# Patient Record
Sex: FEMALE | Race: WHITE | Employment: UNEMPLOYED | URBAN - METROPOLITAN AREA
[De-identification: names, ages, dates, MRNs, and addresses within clinical notes are randomized per-mention and may not be internally consistent; named-entity substitution may affect disease eponyms.]

---

## 2017-03-17 ENCOUNTER — OFFICE VISIT (OUTPATIENT)
Dept: FAMILY MEDICINE | Facility: CLINIC | Age: 57
End: 2017-03-17
Payer: COMMERCIAL

## 2017-03-17 VITALS
SYSTOLIC BLOOD PRESSURE: 160 MMHG | HEIGHT: 62 IN | RESPIRATION RATE: 18 BRPM | BODY MASS INDEX: 30.36 KG/M2 | TEMPERATURE: 97.8 F | HEART RATE: 77 BPM | DIASTOLIC BLOOD PRESSURE: 90 MMHG | OXYGEN SATURATION: 98 % | WEIGHT: 165 LBS

## 2017-03-17 DIAGNOSIS — I10 BENIGN ESSENTIAL HYPERTENSION: ICD-10-CM

## 2017-03-17 DIAGNOSIS — Z84.81 FAMILY HISTORY OF GENETIC DISEASE CARRIER: ICD-10-CM

## 2017-03-17 DIAGNOSIS — D12.6 TUBULAR ADENOMA OF COLON: ICD-10-CM

## 2017-03-17 DIAGNOSIS — R41.3 MEMORY LOSS: Primary | ICD-10-CM

## 2017-03-17 DIAGNOSIS — Z82.0 FAMILY HISTORY OF ALZHEIMER'S DISEASE: ICD-10-CM

## 2017-03-17 LAB
ERYTHROCYTE [DISTWIDTH] IN BLOOD BY AUTOMATED COUNT: 12.7 % (ref 10–15)
HCT VFR BLD AUTO: 43.2 % (ref 35–47)
HGB BLD-MCNC: 14.5 G/DL (ref 11.7–15.7)
HIV 1+2 AB+HIV1 P24 AG SERPL QL IA: NORMAL
MCH RBC QN AUTO: 30 PG (ref 26.5–33)
MCHC RBC AUTO-ENTMCNC: 33.6 G/DL (ref 31.5–36.5)
MCV RBC AUTO: 89 FL (ref 78–100)
PLATELET # BLD AUTO: 182 10E9/L (ref 150–450)
RBC # BLD AUTO: 4.84 10E12/L (ref 3.8–5.2)
VIT B12 SERPL-MCNC: 438 PG/ML (ref 193–986)
WBC # BLD AUTO: 5.5 10E9/L (ref 4–11)

## 2017-03-17 PROCEDURE — 85027 COMPLETE CBC AUTOMATED: CPT | Performed by: FAMILY MEDICINE

## 2017-03-17 PROCEDURE — 86780 TREPONEMA PALLIDUM: CPT | Performed by: FAMILY MEDICINE

## 2017-03-17 PROCEDURE — 84443 ASSAY THYROID STIM HORMONE: CPT | Performed by: FAMILY MEDICINE

## 2017-03-17 PROCEDURE — 82607 VITAMIN B-12: CPT | Performed by: FAMILY MEDICINE

## 2017-03-17 PROCEDURE — 87389 HIV-1 AG W/HIV-1&-2 AB AG IA: CPT | Performed by: FAMILY MEDICINE

## 2017-03-17 PROCEDURE — 36415 COLL VENOUS BLD VENIPUNCTURE: CPT | Performed by: FAMILY MEDICINE

## 2017-03-17 PROCEDURE — 80053 COMPREHEN METABOLIC PANEL: CPT | Performed by: FAMILY MEDICINE

## 2017-03-17 PROCEDURE — 99214 OFFICE O/P EST MOD 30 MIN: CPT | Performed by: FAMILY MEDICINE

## 2017-03-17 NOTE — MR AVS SNAPSHOT
After Visit Summary   3/17/2017    Ирина Rahman    MRN: [id number]           Patient Information     Date Of Birth          1960        Visit Information        Provider Department      3/17/2017 10:30 AM Mag Christianson MD [facility]        Today's Diagnoses     Memory loss    -  1    Family history of genetic disease carrier        Tubular adenoma of colon          Care Instructions    I'm concerned about your memory, too!    We are going to:    1) Check some lab tests for vitamin deficiencies, infection and thyroid problems today to see if they are the cause.    2) Have you schedule an MRI of your brain.   Please call for this appointment at [facility] (call [phone]).      3) Have you see an audiologist for a more thorough hearing exam.      If all of these tests are normal, I'd like you to see 2 specialists:    1) I'd like you to see a neuropsych specialist who can do memory testing.    2) I'd like you to see a neurologist at the [facility] in [place].      Also, you are due for a physical/colon cancer/mammogram!  Let's have you make this appointment when we've figured out all of the above.          Follow-ups after your visit        Additional Services     AUDIOLOGY ADULT REFERRAL       Your provider has referred you to: [facility]: [facility] [phone]   [email or web address]    Specialty Testing:  Audiogram w/Tymps and Reflexes (Comprehensive Audiology Evaluation) and Hearing Aid Consultation            NEUROLOGY ADULT REFERRAL       Your provider has referred you to: [facility]: [facility] [phone]   [email or web address]    Reason for Referral: Memory concerns and family history of alzheimer's and ALS    Please be aware that coverage of these services is subject to the terms and limitations  of your health insurance plan.  Call member services at your health plan with any benefit or coverage questions.      Please bring the following with you to your appointment:    (1) Any X-Rays, CTs or MRIs which have been performed.  Contact the facility where they were done to arrange for  prior to your scheduled appointment.    (2) List of current medications  (3) This referral request   (4) Any documents/labs given to you for this referral            NEUROPSYCHOLOGY REFERRAL       Your provider has referred you to:    [facility]: [facility] ([facility]) - [place] [phone]   [email or web address]  [facility]: [facility], [place] and [place] [phone]   [email or web address]    All scheduling is subject to the client's specific insurance plan & benefits, provider/location availability, and provider clinical specialities.  Please arrive 15 minutes early for your first appointment and bring your completed paperwork.    Please be aware that coverage of these services is subject to the terms and limitations of your health insurance plan.  Call member services at your health plan with any benefit or coverage questions.    Please bring the following to your appointment:  >>   Any x-rays, CTs or MRIs which have been performed.  Contact the facility where they were done to arrange for  prior to your scheduled appointment.  Any new CT, MRI or other procedures ordered by your specialist must be performed at a [place] facility or coordinated by your clinic's referral office.    >>   List of current medications   >>   This referral request   >>   Any documents/labs given to you for this referral                  Future tests that were ordered for you today     Open Future Orders        Priority Expected Expires Ordered    MR Brain w/o & w Contrast Routine  3/17/2018 3/17/2017            Who to contact      "If you have questions or need follow up information about today's clinic visit or your schedule please contact [facility] directly at [phone].  Normal or non-critical lab and imaging results will be communicated to you by MyChart, letter or phone within 4 business days after the clinic has received the results. If you do not hear from us within 7 days, please contact the clinic through "[employer]hart or phone. If you have a critical or abnormal lab result, we will notify you by phone as soon as possible.  Submit refill requests through [employer] or call your pharmacy and they will forward the refill request to us. Please allow 3 business days for your refill to be completed.          Additional Information About Your Visit        [employer] Information     [employer] lets you send messages to your doctor, view your test results, renew your prescriptions, schedule appointments and more. To sign up, go to [email or web address] . Click on \"Log in\" on the left side of the screen, which will take you to the Welcome page. Then click on \"Sign up Now\" on the right side of the page.     You will be asked to enter the access code listed below, as well as some personal information. Please follow the directions to create your username and password.     Your access code is: [email or web address]  Expires: 6/15/2017 11:14 AM     Your access code will  in 90 days. If you need help or a new code, please call your [place] clinic or [phone].        Care EveryWhere ID     This is your Care EveryWhere ID. This could be used by other organizations to access your [place] medical records  [id number]        Your Vitals Were     Pulse Temperature Respirations Height Last Period Pulse Oximetry    77 97.8  F (36.6  C) (Tympanic) 18 5' 2\" (1.575 m) 2012 98%    BMI (Body Mass Index)                   30.18 kg/m2            Blood Pressure from Last 3 Encounters:   17 160/90   16 118/86 "   10/26/16 (!) 158/100    Weight from Last 3 Encounters:   03/17/17 165 lb (74.8 kg)   11/01/16 171 lb (77.6 kg)   10/26/16 177 lb 3.2 oz (80.4 kg)              We Performed the Following     Anti Treponema     AUDIOLOGY ADULT REFERRAL     CBC with platelets     Comprehensive metabolic panel     HIV Antigen Antibody Combo     NEUROLOGY ADULT REFERRAL     NEUROPSYCHOLOGY REFERRAL     TSH with free T4 reflex     Vitamin B12        Primary Care Provider Office Phone # Fax #    Mag Christianson [phone]       [facility] [address] [place] [address]        Thank you!     Thank you for choosing [facility]  for your care. Our goal is always to provide you with excellent care. Hearing back from our patients is one way we can continue to improve our services. Please take a few minutes to complete the written survey that you may receive in the mail after your visit with us. Thank you!             Your Updated Medication List - Protect others around you: Learn how to safely use, store and throw away your medicines at www.disposemymeds.org.          This list is accurate as of: 3/17/17 11:14 AM.  Always use your most recent med list.                   Brand Name Dispense Instructions for use    losartan-hydrochlorothiazide 100-25 MG per tablet    HYZAAR    30 tablet    Take 1 tablet by mouth daily       UNKNOWN TO PATIENT

## 2017-03-17 NOTE — PROGRESS NOTES
"  SUBJECTIVE:  Ирина Rahman is an 57 year old female who presents for evaluation and treatment of confusion symptoms, here with  Brenton and sister Margarette, as well as daughter Sybil who have all asked her to come in.  Ирина doesn't particularly notice and difference, but her family all notices that she has been different lately and neighbors have noticed as well.      Her  notes that over the past several years, but particularly over the past 6-12 months he has noted that she is unable to do things she used to do such as fill out disability paperwork.  He has noted that she has more word finding difficulty, and will sometimes point to things.  He states that neighbors have told him \"you have to get her evaluated\".      Ирина's daughter notes that she repeats herself and stories frequently, and will sometimes interrupt conversations with non-sequitors.  Additionally her sister Margarette said she has noted these changes for at least the last 5 years.    Ирина has not had complete audiology testing, and she feels like some of her difficulty may be not hearing as well.  Ирина does note that she doesn't like to drive as much although denies getting lost.  She denies memory problems or being confused, but is okay having evaluation since her family is concerned.  She denies any physical symptoms.  She does note that she fell about a year ago and broke her leg; wonders if she could have hit her head during that time.    Current symptoms include forgetfulness, depression, social isolation, loss of memory, difficulty with planning or organization and appears apathetic. Family history positive for  Alzheimer's and dementia in the patient s mother and father. Previous treatment modalities employed include none.     Risk factors: genetics/family history  Organic causes of dementia present: neurological    Current Outpatient Prescriptions   Medication     losartan-hydrochlorothiazide (HYZAAR) 100-25 MG per tablet " "    UNKNOWN TO PATIENT     No current facility-administered medications for this visit.      ALLERGY:  Allergies   Allergen Reactions     Latex      Nkda [No Known Drug Allergies]      Red Dye Swelling     Social History   Substance Use Topics     Smoking status: Never Smoker     Smokeless tobacco: Never Used     Alcohol use 0.0 oz/week     0 Standard drinks or equivalent per week      Comment: wine occ     ROS:  (Documentation requirements: Problem=system related; Extended 2-9 system; Complete=10 individual documented and notation all others systems neg.)    CONSTITUTIONAL: NEGATIVE  EYES: NEGATIVE  ENT/MOUTH: NEGATIVE  RESP: NEGATIVE  CV: NEGATIVE  GI: NEGATIVE  : NEGATIVE  MUSCULOSKELETAL: NEGATIVE  INTEGUMENTARY/SKIN: NEGATIVE  BREAST: NEGATIVE  NEURO: NEGATIVE  ENDOCRINE: NEGATIVE  HEME/ALLERGY/IMMUNE: NEGATIVE  PSYCHIATRIC: NEGATIVE    OBJECTIVE:  /90  Pulse 77  Temp 97.8  F (36.6  C) (Tympanic)  Resp 18  Ht 5' 2\" (1.575 m)  Wt 165 lb (74.8 kg)  LMP 06/01/2012  SpO2 98%  BMI 30.18 kg/m2  Mental Status Examination  Posture and motor behavior:  normal  Dress, grooming, personal hygiene:  normal  Facial expression:  Somewhat flattened expression and affect   Speech:  normal  Mood:  normal  Coherency and relevance of thought:  appropriate and within normal limits  Thought content:  normal and within normal limits  Perceptions:  normal  Orientation:  normal  Attention and concentration:  normal  Memory:  See CIT - failed  Information:  normal  Vocabulary:  normal  Abstract reasoning:  not addressed  Judgment:  within normal limits    General appearance: healthy, alert and no distress  Neuro: Gait normal. Reflexes normal and symmetric. Sensation grossly WNL.          Six Item Cognitive Impairment Test   (6CIT):      What year is it?                               Correct - 0 points    What month is it?                               Correct - 0 points      Give the patient an address to remember with five " components:   [place] ( first and last name - 2 components)   [address]  (number and name of street - 2 components)   [place] ( city - 1 component)      About what time is it (within the hour)? Correct - 0 points    Count backwards from 20 to 1:   One error - 2 points    Say the months of the year in reverse: One error - 2 points    Repeat the address phrase:   3 errors - 6 points    Total 6CIT Score:      10/28    Interpretation: The 6CIT uses an inverse score and questions are weighted to produce a total out of 28. Scores of 0-7 are considered normal and 8 or more significant.    Advantages The test has high sensitivity without compromising specificity even in mild dementia. It is easy to translate linguistically and culturally.  Disadvantages The main disadvantage is in the scoring and weighting of the test, which is initially confusing, however computer models have simplified this greatly.    Probability Statistics: At the 7/8 cut off: Overall figures sensitivity 90% specificity 100%, in mild dementia sensitivity = 78% , specificity = 100%    Copyright 2000 [facility]. Courtesy of Dr. Tung Figueroa      ASSESSMENT:    ICD-10-CM    1. Memory loss R41.3 NEUROPSYCHOLOGY REFERRAL     NEUROLOGY ADULT REFERRAL     Vitamin B12     TSH with free T4 reflex     CBC with platelets     Comprehensive metabolic panel     Anti Treponema     HIV Antigen Antibody Combo     MR Brain w/o & w Contrast     AUDIOLOGY ADULT REFERRAL   2. Family history of genetic disease carrier Z84.81    3. Tubular adenoma of colon D12.6    4. Benign essential hypertension I10    5. Family history of Alzheimer's disease Z82.0            PLAN:  Patient Instructions   I'm concerned about your memory, too!    We are going to:    1) Check some lab tests for vitamin deficiencies, infection and thyroid problems today to see if they are the cause.    2) Have you schedule an MRI of your brain.   Please call for  this appointment at [facility] (call [phone]).      3) Have you see an audiologist for a more thorough hearing exam.      If all of these tests are normal, I'd like you to see 2 specialists:    1) I'd like you to see a neuropsych specialist who can do memory testing.    2) I'd like you to see a neurologist at the [facility] in [place].      Also, you are due for a physical/colon cancer/mammogram!  Let's have you make this appointment when we've figured out all of the above.

## 2017-03-17 NOTE — PATIENT INSTRUCTIONS
I'm concerned about your memory, too!    We are going to:    1) Check some lab tests for vitamin deficiencies, infection and thyroid problems today to see if they are the cause.    2) Have you schedule an MRI of your brain.   Please call for this appointment at [facility] (call [phone]).      3) Have you see an audiologist for a more thorough hearing exam.      If all of these tests are normal, I'd like you to see 2 specialists:    1) I'd like you to see a neuropsych specialist who can do memory testing.    2) I'd like you to see a neurologist at the [facility] in [place].      Also, you are due for a physical/colon cancer/mammogram!  Let's have you make this appointment when we've figured out all of the above.

## 2017-03-17 NOTE — NURSING NOTE
"Chief Complaint   Patient presents with     Memory Loss       Initial /90  Pulse 77  Temp 97.8  F (36.6  C) (Tympanic)  Resp 18  Ht 5' 2\" (1.575 m)  Wt 165 lb (74.8 kg)  LMP 06/01/2012  SpO2 98%  BMI 30.18 kg/m2 Estimated body mass index is 30.18 kg/(m^2) as calculated from the following:    Height as of this encounter: 5' 2\" (1.575 m).    Weight as of this encounter: 165 lb (74.8 kg).  Medication Reconciliation: complete   .Heriberto MEJÍA      "

## 2017-03-18 LAB
ALBUMIN SERPL-MCNC: 4.2 G/DL (ref 3.4–5)
ALP SERPL-CCNC: 87 U/L (ref 40–150)
ALT SERPL W P-5'-P-CCNC: 38 U/L (ref 0–50)
ANION GAP SERPL CALCULATED.3IONS-SCNC: 9 MMOL/L (ref 3–14)
AST SERPL W P-5'-P-CCNC: 18 U/L (ref 0–45)
BILIRUB SERPL-MCNC: 0.6 MG/DL (ref 0.2–1.3)
BUN SERPL-MCNC: 21 MG/DL (ref 7–30)
CALCIUM SERPL-MCNC: 9.2 MG/DL (ref 8.5–10.1)
CHLORIDE SERPL-SCNC: 103 MMOL/L (ref 94–109)
CO2 SERPL-SCNC: 29 MMOL/L (ref 20–32)
CREAT SERPL-MCNC: 0.82 MG/DL (ref 0.52–1.04)
GFR SERPL CREATININE-BSD FRML MDRD: 72 ML/MIN/1.7M2
GLUCOSE SERPL-MCNC: 115 MG/DL (ref 70–99)
POTASSIUM SERPL-SCNC: 3.6 MMOL/L (ref 3.4–5.3)
PROT SERPL-MCNC: 8.2 G/DL (ref 6.8–8.8)
SODIUM SERPL-SCNC: 141 MMOL/L (ref 133–144)
T PALLIDUM IGG+IGM SER QL: NEGATIVE
TSH SERPL DL<=0.005 MIU/L-ACNC: 1.15 MU/L (ref 0.4–4)

## 2017-03-24 ENCOUNTER — HOSPITAL ENCOUNTER (OUTPATIENT)
Dept: MRI IMAGING | Facility: CLINIC | Age: 57
Discharge: HOME | End: 2017-03-24
Attending: FAMILY MEDICINE | Admitting: FAMILY MEDICINE
Payer: COMMERCIAL

## 2017-03-24 DIAGNOSIS — R41.3 MEMORY LOSS: ICD-10-CM

## 2017-03-24 PROCEDURE — 70553 MRI BRAIN STEM W/O & W/DYE: CPT

## 2017-03-24 PROCEDURE — A9585 GADOBUTROL INJECTION: HCPCS | Performed by: FAMILY MEDICINE

## 2017-03-24 PROCEDURE — 25500064 ZZH RX 255 OP 636: Performed by: FAMILY MEDICINE

## 2017-03-24 RX ORDER — GADOBUTROL 604.72 MG/ML
7 INJECTION INTRAVENOUS ONCE
Status: COMPLETED | OUTPATIENT
Start: 2017-03-24 | End: 2017-03-24

## 2017-03-24 RX ADMIN — GADOBUTROL 7 ML: 604.72 INJECTION INTRAVENOUS at 17:07

## 2017-03-28 ENCOUNTER — OFFICE VISIT (OUTPATIENT)
Dept: AUDIOLOGY | Facility: CLINIC | Age: 57
End: 2017-03-28
Payer: COMMERCIAL

## 2017-03-28 DIAGNOSIS — H93.25 CENTRAL AUDITORY PROCESSING DISORDER (CAPD): Primary | ICD-10-CM

## 2017-03-28 PROCEDURE — 92557 COMPREHENSIVE HEARING TEST: CPT | Performed by: AUDIOLOGIST

## 2017-03-28 PROCEDURE — 92567 TYMPANOMETRY: CPT | Performed by: AUDIOLOGIST

## 2017-03-28 NOTE — PROGRESS NOTES
Audiology Note     SUBJECTIVE:  Ирина Rahman is a 57 year old female, accompanied by her , was seen at [facility] for audiologic evaluation and was referred by Dr. Mag Christianson. The patient reports a bad fall with possible head injury with broken bones 1 year ago.  She has a slow decline in hearing over the past 4 years. The patient denies family history of hearing loss, chronic middle ear problems, tinnitus, dizziness, otalgia, ear drainage and noise exposure.  The patient notes difficulty with communication in a variety of listening situations and when background noise is present.    OBJECTIVE:    Otoscopic exam indicates ears are clear of cerumen bilaterally    Pure Tone Thresholds assessed using conventional audiometry with good reliability from 250-8000 Hz bilaterally using high frequency circumaural headphones revealed;    RIGHT:  Normal hearing     LEFT:    Normal hearing   Please refer to scanned audiogram(s) for further details.     Speech Reception Threshold:    RIGHT: 30 dB HL    LEFT:   30 dB HL    Word Recognition Score:    RIGHT: 80% at 65 dB HL using NU-6  recorded word list.    LEFT:   76% at 65 dB HL using NU-6 recorded word list.    Tympanogram:     RIGHT: Type A     LEFT:   Type A     Acoustic Reflexes (reported by stimulus ear):  RIGHT: Ipsilateral is absent at frequencies tested  RIGHT: Contralateral is absent at frequencies tested  LEFT:   Ipsilateral is absent at frequencies tested  LEFT:   Contralateral is absent at frequencies tested     ASSESSMENT:   Central Auditory Processing Disorder  Peripheral hearing is within normal limits, however SRTs are in mild hearing loss range and Word recognition scores are lower than expected based on normal hearing threshold levels. Today s results were discussed with the patient in detail and a copy of the audiogram was provided upon request.    PLAN: Patient was counseled regarding hearing loss and impact on communication.   Patient is a not candidate for amplification at this time, with Articulation Index scores of 96% right and 95% left. Handouts on good communication strategies and hearing were provided. Referred back to Dr. Christianson for follow up. Please call this clinic with questions regarding these results or recommendations.    Sandra Lovett M.S., F-AAA  Licensed Audiologist, [place] #0000

## 2017-03-29 NOTE — PROGRESS NOTES
Called Ирина & Brenton:  MRI shows mild cerebral atrophy, no other findings.  Hearing test was okay.  Also - lab tests normal.  Should follow up with neurology and neuropsych as we discussed.  Mag Christianson MD

## 2017-04-21 ENCOUNTER — OFFICE VISIT (OUTPATIENT)
Dept: FAMILY MEDICINE | Facility: CLINIC | Age: 57
End: 2017-04-21
Payer: COMMERCIAL

## 2017-04-21 VITALS
WEIGHT: 165 LBS | DIASTOLIC BLOOD PRESSURE: 120 MMHG | TEMPERATURE: 97.2 F | SYSTOLIC BLOOD PRESSURE: 172 MMHG | HEART RATE: 80 BPM | HEIGHT: 62 IN | BODY MASS INDEX: 30.36 KG/M2

## 2017-04-21 DIAGNOSIS — E55.9 VITAMIN D DEFICIENCY: ICD-10-CM

## 2017-04-21 DIAGNOSIS — S82.892A FRACTURE OF LEFT ANKLE: Primary | ICD-10-CM

## 2017-04-21 DIAGNOSIS — I16.0 HYPERTENSIVE URGENCY: Primary | ICD-10-CM

## 2017-04-21 LAB — ERYTHROCYTE [SEDIMENTATION RATE] IN BLOOD BY WESTERGREN METHOD: 9 MM/H (ref 0–30)

## 2017-04-21 PROCEDURE — 36415 COLL VENOUS BLD VENIPUNCTURE: CPT | Performed by: FAMILY MEDICINE

## 2017-04-21 PROCEDURE — 85652 RBC SED RATE AUTOMATED: CPT

## 2017-04-21 PROCEDURE — 99214 OFFICE O/P EST MOD 30 MIN: CPT | Performed by: FAMILY MEDICINE

## 2017-04-21 PROCEDURE — 80048 BASIC METABOLIC PNL TOTAL CA: CPT | Performed by: FAMILY MEDICINE

## 2017-04-21 PROCEDURE — 82306 VITAMIN D 25 HYDROXY: CPT

## 2017-04-21 RX ORDER — HYDRALAZINE HYDROCHLORIDE 25 MG/1
12.5-25 TABLET, FILM COATED ORAL 3 TIMES DAILY
Qty: 90 TABLET | Refills: 1 | Status: SHIPPED | OUTPATIENT
Start: 2017-04-21 | End: 2017-06-12

## 2017-04-21 RX ORDER — AMLODIPINE BESYLATE 5 MG/1
5 TABLET ORAL DAILY
Qty: 90 TABLET | Refills: 1 | Status: SHIPPED | OUTPATIENT
Start: 2017-04-21 | End: 2017-08-30

## 2017-04-21 NOTE — PROGRESS NOTES
Hypertensive urgency.  Plan:  Start hydralazine 25 mg TID.  If drops too low go to 12.5 mg TID  Follow up with me in clinic tomorrow  Continue hyzaar    .    SUBJECTIVE:                                                    Ирина Rahman is a 57 year old female who presents to clinic today for the following health issues:    Was in and saw neurology yesterday; had blood pressure 160/100 or 170/110 on repeat.  This morning took 2 of blood pressure medications (lisinopril/hctz) and still too high.  Worried.  Feeling fine though; no HA, no vision changes.      Problem list and histories reviewed & adjusted, as indicated.  Additional history: as documented    BP Readings from Last 3 Encounters:   04/21/17 (!) 172/120   03/17/17 160/90   11/01/16 118/86    Wt Readings from Last 3 Encounters:   03/17/17 165 lb (74.8 kg)   11/01/16 171 lb (77.6 kg)   10/26/16 177 lb 3.2 oz (80.4 kg)                    Reviewed and updated as needed this visit by clinical staff       Reviewed and updated as needed this visit by Provider         ROS:  Constitutional, HEENT, cardiovascular, pulmonary, gi and gu systems are negative, except as otherwise noted.    OBJECTIVE:                                                    BP (!) 172/120  LMP 06/01/2012  There is no height or weight on file to calculate BMI.  GENERAL: healthy, alert and no distress  EYES: Eyes grossly normal to inspection, PERRL and conjunctivae and sclerae normal  RESP: lungs clear to auscultation - no rales, rhonchi or wheezes  CV: regular rate and rhythm, normal S1 S2, no S3 or S4, no murmur, click or rub, no peripheral edema and peripheral pulses strong       ASSESSMENT/PLAN:                                                        Diagnoses and all orders for this visit:    Hypertensive urgency  -     Basic metabolic panel  -     hydrALAZINE (APRESOLINE) 25 MG tablet; Take 0.5-1 tablets (12.5-25 mg) by mouth 3 times daily  -     amLODIPine (NORVASC) 5 MG tablet; Take 1  tablet (5 mg) by mouth daily     --No symptoms, need to get under control within a day or two.   --Add amlodipine dialy.   --Start hydralazine today.   --Check blood pressure throughout the day today.  If develops symptoms or if blood pressure goes over 200/120 - to ER.   --Follow up in clinic tomorrow, will adjust as needed.    Greater than 25 minutes total were spent on this encounter, of which more than 50% was spent in direct communication with the patient including history, exam, counseling and coordination of care.    Mag Christianson MD  [facility]

## 2017-04-21 NOTE — MR AVS SNAPSHOT
After Visit Summary   4/21/2017    Ирина Rahman    MRN: [id number]           Patient Information     Date Of Birth          1960        Visit Information        Provider Department      4/21/2017 9:45 AM Mag Christianson MD [facility]        Today's Diagnoses     Hypertensive urgency    -  1       Follow-ups after your visit        Your next 10 appointments already scheduled     Apr 22, 2017 10:15 AM CDT   SHORT with Mag Christianson MD   [facility] ([facility])    [address] [place] [address]   [phone]            Apr 25, 2017  1:15 PM CDT   LAB with UP LAB   [facility] ([facility])    [address] [facility] [id number] [phone]           Patient must bring picture ID.  Patient should be prepared to give a urine specimen  Please do not eat 10-12 hours before your appointment if you are coming in fasting for labs on lipids, cholesterol, or glucose (sugar).  Pregnant women should follow their Care Team instructions. Water with medications is okay. Do not drink coffee or other fluids.   If you have concerns about taking  your medications, please ask at office or if scheduling via [employer], send a message by clicking on Secure Messaging, Message Your Care Team.            May 03, 2017  9:00 AM CDT   (Arrive by 8:45 AM)   New Patient Visit with STACEY Elliott [facility] ([facility])    [address] [facility]  [address] [facility] [address] [phone]              Who to contact     If you have questions or need follow up information about today's clinic visit or your schedule please contact [facility] directly at [phone].  Normal or non-critical lab and imaging results will be communicated to you by MyChart, letter or phone  "within 4 business days after the clinic has received the results. If you do not hear from us within 7 days, please contact the clinic through [employer] or phone. If you have a critical or abnormal lab result, we will notify you by phone as soon as possible.  Submit refill requests through [employer] or call your pharmacy and they will forward the refill request to us. Please allow 3 business days for your refill to be completed.          Additional Information About Your Visit        [employer] Information     [employer] lets you send messages to your doctor, view your test results, renew your prescriptions, schedule appointments and more. To sign up, go to [email or web address] . Click on \"Log in\" on the left side of the screen, which will take you to the Welcome page. Then click on \"Sign up Now\" on the right side of the page.     You will be asked to enter the access code listed below, as well as some personal information. Please follow the directions to create your username and password.     Your access code is: [email or web address]  Expires: 6/15/2017 11:14 AM     Your access code will  in 90 days. If you need help or a new code, please call your [place] clinic or [phone].        Care EveryWhere ID     This is your Care EveryWhere ID. This could be used by other organizations to access your [place] medical records  [id number]        Your Vitals Were     Pulse Temperature Height Last Period BMI (Body Mass Index)       80 97.2  F (36.2  C) 5' 2\" (1.575 m) 2012 30.18 kg/m2        Blood Pressure from Last 3 Encounters:   17 (!) 172/120   17 160/90   16 118/86    Weight from Last 3 Encounters:   17 165 lb (74.8 kg)   17 165 lb (74.8 kg)   16 171 lb (77.6 kg)              We Performed the Following     Basic metabolic panel          Today's Medication Changes          These changes are accurate as of: 17  1:30 PM.  If you have any questions, ask your nurse or doctor.       "         Start taking these medicines.        Dose/Directions    amLODIPine 5 MG tablet   Commonly known as:  NORVASC   Used for:  Hypertensive urgency   Started by:  Mag Christianson MD        Dose:  5 mg   Take 1 tablet (5 mg) by mouth daily   Quantity:  90 tablet   Refills:  1       hydrALAZINE 25 MG tablet   Commonly known as:  APRESOLINE   Used for:  Hypertensive urgency   Started by:  Mag Christianson MD        Dose:  12.5-25 mg   Take 0.5-1 tablets (12.5-25 mg) by mouth 3 times daily   Quantity:  90 tablet   Refills:  1            Where to get your medicines      These medications were sent to [facility] [address] [address] AT [place] [address] & [address] [address]     Phone:  [phone]     amLODIPine 5 MG tablet    hydrALAZINE 25 MG tablet                Primary Care Provider Office Phone # Fax #    Mag Christianson [phone]       [address] [address]        Thank you!     Thank you for choosing [facility]  for your care. Our goal is always to provide you with excellent care. Hearing back from our patients is one way we can continue to improve our services. Please take a few minutes to complete the written survey that you may receive in the mail after your visit with us. Thank you!             Your Updated Medication List - Protect others around you: Learn how to safely use, store and throw away your medicines at www.disposemymeds.org.          This list is accurate as of: 4/21/17  1:30 PM.  Always use your most recent med list.                   Brand Name Dispense Instructions for use    amLODIPine 5 MG tablet    NORVASC    90 tablet    Take 1 tablet (5 mg) by mouth daily       hydrALAZINE 25 MG tablet    APRESOLINE    90 tablet    Take 0.5-1 tablets (12.5-25 mg) by mouth 3 times daily       losartan-hydrochlorothiazide 100-25 MG per  tablet    HYZAAR    30 tablet    Take 1 tablet by mouth daily       UNKNOWN TO PATIENT

## 2017-04-22 ENCOUNTER — OFFICE VISIT (OUTPATIENT)
Dept: FAMILY MEDICINE | Facility: CLINIC | Age: 57
End: 2017-04-22
Payer: COMMERCIAL

## 2017-04-22 VITALS
SYSTOLIC BLOOD PRESSURE: 152 MMHG | HEART RATE: 83 BPM | RESPIRATION RATE: 15 BRPM | OXYGEN SATURATION: 98 % | WEIGHT: 164 LBS | DIASTOLIC BLOOD PRESSURE: 96 MMHG | BODY MASS INDEX: 30 KG/M2 | TEMPERATURE: 97.7 F

## 2017-04-22 DIAGNOSIS — I10 HYPERTENSION, UNCONTROLLED: Primary | ICD-10-CM

## 2017-04-22 LAB
ANION GAP SERPL CALCULATED.3IONS-SCNC: 11 MMOL/L (ref 3–14)
BUN SERPL-MCNC: 22 MG/DL (ref 7–30)
CALCIUM SERPL-MCNC: 9.6 MG/DL (ref 8.5–10.1)
CHLORIDE SERPL-SCNC: 103 MMOL/L (ref 94–109)
CO2 SERPL-SCNC: 29 MMOL/L (ref 20–32)
CREAT SERPL-MCNC: 0.79 MG/DL (ref 0.52–1.04)
GFR SERPL CREATININE-BSD FRML MDRD: 76 ML/MIN/1.7M2
GLUCOSE SERPL-MCNC: 101 MG/DL (ref 70–99)
POTASSIUM SERPL-SCNC: 3.5 MMOL/L (ref 3.4–5.3)
SODIUM SERPL-SCNC: 143 MMOL/L (ref 133–144)

## 2017-04-22 PROCEDURE — 99213 OFFICE O/P EST LOW 20 MIN: CPT | Performed by: FAMILY MEDICINE

## 2017-04-22 NOTE — PATIENT INSTRUCTIONS
Your blood pressure today is still too high, but much better.    I want you to take:     1 Losartan-hydrochlorothiazide (100/25) every day in the morning.    2 Amlodipine (10 mg) every day in the morning.    2 Hydralazine (50 mg) at breakfast time (8 am), lunch time (1 pm), after dinner time (7 pm)  (Three times a day, every 6-8 hours or so)    Check your blood pressure every 3 hours today - once it is in normal range can go down to 1-2 times a day. (less than 140/90)    Get your MRI/MRA scheduled ASAP through neurologist.    I'll see you Tuesday at 2:30 pm for follow up.

## 2017-04-22 NOTE — PROGRESS NOTES
SUBJECTIVE:                                                    Ирина Rahman is a 57 year old female who presents to clinic today for the following health issues:      Hypertension Follow-up      Outpatient blood pressures are being checked at home.  Results are 149/85.    Low Salt Diet: no added salt       Amount of exercise or physical activity: maybe once per week, due to broke leg    Problems taking medications regularly: No    Medication side effects: none    Diet: regular (no restrictions)    Yesterday blood pressure stayed high.  This /85.    This am took 2 losartan/hctz this morning.  Took 1 hydralazine  [place] 1 5 mg amlodipine 9 am    Still feeling fine - no headache, vision changes.    Problem list and histories reviewed & adjusted, as indicated.  Additional history: as documented    Reviewed and updated as needed this visit by clinical staff       Reviewed and updated as needed this visit by Provider         ROS:  Constitutional, HEENT, cardiovascular, pulmonary, gi and gu systems are negative, except as otherwise noted.    OBJECTIVE:                                                    BP (!) 152/96  Pulse 83  Temp 97.7  F (36.5  C) (Tympanic)  Resp 15  Wt 164 lb (74.4 kg)  LMP 06/01/2012  SpO2 98%  BMI 30 kg/m2  Body mass index is 30 kg/(m^2).  GENERAL: healthy, alert and no distress  EYES: Eyes grossly normal to inspection, PERRL and conjunctivae and sclerae normal  HENT: ear canals and TM's normal, nose and mouth without ulcers or lesions  NECK: no adenopathy, no asymmetry, masses, or scars and thyroid normal to palpation  RESP: lungs clear to auscultation - no rales, rhonchi or wheezes  CV: regular rate and rhythm, normal S1 S2, no S3 or S4, no murmur, click or rub, no peripheral edema and peripheral pulses strong     ASSESSMENT/PLAN:                                                        Ирина was seen today for hypertension.    Diagnoses and all orders for this  visit:    Hypertension, uncontrolled    Other orders  -     Cancel: MA SCREENING DIGITAL BILAT - Future  (s+30); Future  -     Cancel: Pap imaged thin layer screen with HPV - recommended age 30 - 65 years (select HPV order below)        Patient Instructions   Your blood pressure today is still too high, but much better.    I want you to take:     1 Losartan-hydrochlorothiazide (100/25) every day in the morning.    2 Amlodipine (10 mg) every day in the morning.    2 Hydralazine (50 mg) at breakfast time (8 am), lunch time (1 pm), after dinner time (7 pm)  (Three times a day, every 6-8 hours or so)    Check your blood pressure every 3 hours today - once it is in normal range can go down to 1-2 times a day. (less than 140/90)    Get your MRI/MRA scheduled ASAP through neurologist.    I'll see you Tuesday at 2:30 pm for follow up.            Mag Christianson MD  [facility]

## 2017-04-22 NOTE — MR AVS SNAPSHOT
After Visit Summary   4/22/2017    Ирина Rahman    MRN: [id number]           Patient Information     Date Of Birth          1960        Visit Information        Provider Department      4/22/2017 10:15 AM Mag Christianson MD [facility]        Today's Diagnoses     Screen for colon cancer        Visit for screening mammogram        Screening for malignant neoplasm of cervix          Care Instructions    Your blood pressure today is still too high, but much better.    I want you to take:     1 Losartan-hydrochlorothiazide (100/25) every day in the morning.    2 Amlodipine (10 mg) every day in the morning.    2 Hydralazine (50 mg) at breakfast time (8 am), lunch time (1 pm), after dinner time (7 pm)  (Three times a day, every 6-8 hours or so)    Check your blood pressure every 3 hours today - once it is in normal range can go down to 1-2 times a day. (less than 140/90)    I'll see you Tuesday.            Follow-ups after your visit        Your next 10 appointments already scheduled     Apr 25, 2017  1:15 PM CDT   LAB with UP LAB   [facility] ([facility])    [address] [facility] [address] [phone]           Patient must bring picture ID.  Patient should be prepared to give a urine specimen  Please do not eat 10-12 hours before your appointment if you are coming in fasting for labs on lipids, cholesterol, or glucose (sugar).  Pregnant women should follow their Care Team instructions. Water with medications is okay. Do not drink coffee or other fluids.   If you have concerns about taking  your medications, please ask at office or if scheduling via [employer], send a message by clicking on Secure Messaging, Message Your Care Team.            May 03, 2017  9:00 AM CDT   (Arrive by 8:45 AM)   New Patient Visit with STACEY Elliott [facility] ([facility])    [address] [place] [address]  "[facility] [address] [phone]              Who to contact     If you have questions or need follow up information about today's clinic visit or your schedule please contact [facility] directly at [phone].  Normal or non-critical lab and imaging results will be communicated to you by MyChart, letter or phone within 4 business days after the clinic has received the results. If you do not hear from us within 7 days, please contact the clinic through MyChart or phone. If you have a critical or abnormal lab result, we will notify you by phone as soon as possible.  Submit refill requests through [employer] or call your pharmacy and they will forward the refill request to us. Please allow 3 business days for your refill to be completed.          Additional Information About Your Visit        MyChart Information     [employer] lets you send messages to your doctor, view your test results, renew your prescriptions, schedule appointments and more. To sign up, go to [email or web address] . Click on \"Log in\" on the left side of the screen, which will take you to the Welcome page. Then click on \"Sign up Now\" on the right side of the page.     You will be asked to enter the access code listed below, as well as some personal information. Please follow the directions to create your username and password.     Your access code is: [email or web address]  Expires: 6/15/2017 11:14 AM     Your access code will  in 90 days. If you need help or a new code, please call your [facility] or [phone].        Care EveryWhere ID     This is your Care EveryWhere ID. This could be used by other organizations to access your [place] medical records  [id number]        Your Vitals Were     Pulse Temperature Respirations Last Period Pulse Oximetry BMI (Body Mass Index)    83 97.7  F (36.5  C) (Tympanic) 15 2012 98% 30 kg/m2       Blood Pressure from Last 3 Encounters:   17 (!) 164/104 "   04/21/17 (!) 172/120   03/17/17 160/90    Weight from Last 3 Encounters:   04/22/17 164 lb (74.4 kg)   04/21/17 165 lb (74.8 kg)   03/17/17 165 lb (74.8 kg)              Today, you had the following     No orders found for display       Primary Care Provider Office Phone # Fax #    Mag Christianson [phone]       [address] [address]        Thank you!     Thank you for choosing [facility]  for your care. Our goal is always to provide you with excellent care. Hearing back from our patients is one way we can continue to improve our services. Please take a few minutes to complete the written survey that you may receive in the mail after your visit with us. Thank you!             Your Updated Medication List - Protect others around you: Learn how to safely use, store and throw away your medicines at www.disposemymeds.org.          This list is accurate as of: 4/22/17 10:35 AM.  Always use your most recent med list.                   Brand Name Dispense Instructions for use    amLODIPine 5 MG tablet    NORVASC    90 tablet    Take 1 tablet (5 mg) by mouth daily       hydrALAZINE 25 MG tablet    APRESOLINE    90 tablet    Take 0.5-1 tablets (12.5-25 mg) by mouth 3 times daily       losartan-hydrochlorothiazide 100-25 MG per tablet    HYZAAR    30 tablet    Take 1 tablet by mouth daily       UNKNOWN TO PATIENT

## 2017-04-22 NOTE — NURSING NOTE
"Chief Complaint   Patient presents with     Hypertension       Initial BP (!) 164/104 (BP Location: Right arm, Patient Position: Chair, Cuff Size: Adult Regular)  Pulse 83  Temp 97.7  F (36.5  C) (Tympanic)  Resp 15  Wt 164 lb (74.4 kg)  LMP 06/01/2012  SpO2 98%  BMI 30 kg/m2 Estimated body mass index is 30 kg/(m^2) as calculated from the following:    Height as of 4/21/17: 5' 2\" (1.575 m).    Weight as of this encounter: 164 lb (74.4 kg).  Medication Reconciliation: complete    "

## 2017-04-22 NOTE — PROGRESS NOTES
This lab was reviewed with patient in office; see my office visit note for details.   Good news that kidney function looks good - acute renal failure is not cause of uncontrolled hypertension.  Mag Christianson MD

## 2017-04-23 LAB — DEPRECATED CALCIDIOL+CALCIFEROL SERPL-MC: 17 UG/L (ref 20–75)

## 2017-04-25 ENCOUNTER — OFFICE VISIT (OUTPATIENT)
Dept: FAMILY MEDICINE | Facility: CLINIC | Age: 57
End: 2017-04-25
Payer: COMMERCIAL

## 2017-04-25 VITALS
HEIGHT: 62 IN | DIASTOLIC BLOOD PRESSURE: 80 MMHG | TEMPERATURE: 97.6 F | RESPIRATION RATE: 16 BRPM | HEART RATE: 94 BPM | BODY MASS INDEX: 30.18 KG/M2 | SYSTOLIC BLOOD PRESSURE: 138 MMHG | OXYGEN SATURATION: 96 % | WEIGHT: 164 LBS

## 2017-04-25 DIAGNOSIS — I10 BENIGN ESSENTIAL HYPERTENSION: Primary | ICD-10-CM

## 2017-04-25 DIAGNOSIS — Z00.00 PREVENTATIVE HEALTH CARE: ICD-10-CM

## 2017-04-25 PROCEDURE — 99213 OFFICE O/P EST LOW 20 MIN: CPT | Performed by: FAMILY MEDICINE

## 2017-04-25 NOTE — MR AVS SNAPSHOT
After Visit Summary   4/25/2017    Ирина Rahman    MRN: [id number]           Patient Information     Date Of Birth          1960        Visit Information        Provider Department      4/25/2017 2:30 PM Mag Christianson MD [facility]        Today's Diagnoses     Benign essential hypertension    -  1    Preventative health care           Follow-ups after your visit        Your next 10 appointments already scheduled     May 03, 2017  9:00 AM CDT   (Arrive by 8:45 AM)   New Patient Visit with Margo Gu LP   [facility] ([facility])    [address] [place] [address]  3rd Floor  [facility] [address]   [phone]            Jun 16, 2017 10:00 AM CDT   MA SCREENING DIGITAL BILATERAL with BMMA1   [facility] ([facility])    [address] [place] [address], [address]  [facility] [address] [phone]           Do not use any powder, lotion or deodorant under your arms or on your breast. If you do, we will ask you to remove it before your exam.  Wear comfortable, two-piece clothing.  If you have any allergies, tell your care team.  Bring any previous mammograms from other facilities or have them mailed to the breast center.            Jun 16, 2017 10:15 AM CDT   PHYSICAL with Mag Christianson MD   [facility] ([facility])    [address] [place] [address]  [address]  [facility] [address] [phone]              Who to contact     If you have questions or need follow up information about today's clinic visit or your schedule please contact [facility] directly at [phone].  Normal or non-critical lab and imaging results will be communicated to you by MyChart, letter or phone within 4 business days after the clinic has received the results. If you do not hear  "from us within 7 days, please contact the clinic through [employer] or phone. If you have a critical or abnormal lab result, we will notify you by phone as soon as possible.  Submit refill requests through [employer] or call your pharmacy and they will forward the refill request to us. Please allow 3 business days for your refill to be completed.          Additional Information About Your Visit        [employer] Information     [employer] lets you send messages to your doctor, view your test results, renew your prescriptions, schedule appointments and more. To sign up, go to [email or web address] . Click on \"Log in\" on the left side of the screen, which will take you to the Welcome page. Then click on \"Sign up Now\" on the right side of the page.     You will be asked to enter the access code listed below, as well as some personal information. Please follow the directions to create your username and password.     Your access code is: [email or web address]  Expires: 6/15/2017 11:14 AM     Your access code will  in 90 days. If you need help or a new code, please call your [place] clinic or [phone].        Care EveryWhere ID     This is your Care EveryWhere ID. This could be used by other organizations to access your [place] medical records  [id number]        Your Vitals Were     Pulse Temperature Respirations Height Last Period Pulse Oximetry    94 97.6  F (36.4  C) (Tympanic) 16 5' 2\" (1.575 m) 2012 96%    BMI (Body Mass Index)                   30 kg/m2            Blood Pressure from Last 3 Encounters:   17 138/80   17 (!) 152/96   17 (!) 172/120    Weight from Last 3 Encounters:   17 164 lb (74.4 kg)   17 164 lb (74.4 kg)   17 165 lb (74.8 kg)              Today, you had the following     No orders found for display       Primary Care Provider Office Phone # Fax #    Mag Christianson [phone]       [address] " [address]        Thank you!     Thank you for choosing [facility]  for your care. Our goal is always to provide you with excellent care. Hearing back from our patients is one way we can continue to improve our services. Please take a few minutes to complete the written survey that you may receive in the mail after your visit with us. Thank you!             Your Updated Medication List - Protect others around you: Learn how to safely use, store and throw away your medicines at www.disposemymeds.org.          This list is accurate as of: 4/25/17  2:45 PM.  Always use your most recent med list.                   Brand Name Dispense Instructions for use    amLODIPine 5 MG tablet    NORVASC    90 tablet    Take 1 tablet (5 mg) by mouth daily       hydrALAZINE 25 MG tablet    APRESOLINE    90 tablet    Take 0.5-1 tablets (12.5-25 mg) by mouth 3 times daily       losartan-hydrochlorothiazide 100-25 MG per tablet    HYZAAR    30 tablet    Take 1 tablet by mouth daily       UNKNOWN TO PATIENT

## 2017-04-25 NOTE — PROGRESS NOTES
"  SUBJECTIVE:                                                    Ирина Rahman is a 57 year old female who presents to clinic today for the following health issues:      Hypertension Follow-up      Outpatient blood pressures are being checked at home.  Results are 138/82.    Low Salt Diet: no added salt       Amount of exercise or physical activity: walking walking 2 times a week about 1 hour    Problems taking medications regularly: No    Medication side effects: none    Diet: low salt      57 year old with recently uncontrolled hypertension and hypertensive urgency.  Now better - since the weekend BPs have been all in normal range 120-130/80s.    Now taking:    Current Outpatient Prescriptions:      hydrALAZINE (APRESOLINE) 25 MG tablet, Take 0.5-1 tablets (12.5-25 mg) by mouth 3 times daily, Disp: 90 tablet, Rfl: 1     amLODIPine (NORVASC) 5 MG tablet, Take 1 tablet (5 mg) by mouth daily, Disp: 90 tablet, Rfl: 1     losartan-hydrochlorothiazide (HYZAAR) 100-25 MG per tablet, Take 1 tablet by mouth daily, Disp: 30 tablet, Rfl: 5     UNKNOWN TO PATIENT, , Disp: , Rfl:        Still feeling fine - no headache, vision changes.      Problem list and histories reviewed & adjusted, as indicated.  Additional history: as documented    Reviewed and updated as needed this visit by clinical staff  Tobacco  Allergies       Reviewed and updated as needed this visit by Provider       ROS:  Constitutional, HEENT, cardiovascular, pulmonary, gi and gu systems are negative, except as otherwise noted.    OBJECTIVE:                                                    /80  Pulse 94  Temp 97.6  F (36.4  C) (Tympanic)  Resp 16  Ht 5' 2\" (1.575 m)  Wt 164 lb (74.4 kg)  LMP 06/01/2012  SpO2 96%  BMI 30 kg/m2  Body mass index is 30 kg/(m^2).  GENERAL: healthy, alert and no distress  EYES: Eyes grossly normal to inspection, PERRL and conjunctivae and sclerae normal  HENT: ear canals and TM's normal, nose and mouth without " ulcers or lesions  NECK: no adenopathy, no asymmetry, masses, or scars and thyroid normal to palpation  RESP: lungs clear to auscultation - no rales, rhonchi or wheezes  CV: regular rate and rhythm, normal S1 S2, no S3 or S4, no murmur, click or rub, no peripheral edema and peripheral pulses strong     ASSESSMENT/PLAN:                                                        Ирина was seen today for hypertension.    Diagnoses and all orders for this visit:    Benign essential hypertension  Comments:  Well controlled now!  Continue current meds.  Follow up in 2 weeks, may try to adjust hydralazine then for ease of use.    Preventative health care  Comments:  overdue for fit, pap, mammo.  will schedule for when they return from [facility] in 2-3 weeks        Mag Christianson MD  [facility]

## 2017-04-25 NOTE — NURSING NOTE
"Chief Complaint   Patient presents with     Hypertension       Initial /80  Pulse 94  Temp 97.6  F (36.4  C) (Tympanic)  Resp 16  Ht 5' 2\" (1.575 m)  Wt 164 lb (74.4 kg)  LMP 06/01/2012  SpO2 96%  BMI 30 kg/m2 Estimated body mass index is 30 kg/(m^2) as calculated from the following:    Height as of this encounter: 5' 2\" (1.575 m).    Weight as of this encounter: 164 lb (74.4 kg).  Medication Reconciliation: complete   .Heriberto MEJÍA      "

## 2017-05-05 ENCOUNTER — TRANSFERRED RECORDS (OUTPATIENT)
Dept: HEALTH INFORMATION MANAGEMENT | Facility: CLINIC | Age: 57
End: 2017-05-05

## 2017-05-15 ENCOUNTER — TRANSFERRED RECORDS (OUTPATIENT)
Dept: HEALTH INFORMATION MANAGEMENT | Facility: CLINIC | Age: 57
End: 2017-05-15

## 2017-05-16 PROBLEM — G31.09 FRONTOTEMPORAL DEMENTIA (H): Status: ACTIVE | Noted: 2017-05-16

## 2017-05-16 PROBLEM — F02.80 FRONTOTEMPORAL DEMENTIA (H): Status: ACTIVE | Noted: 2017-05-16

## 2017-06-12 DIAGNOSIS — I16.0 HYPERTENSIVE URGENCY: ICD-10-CM

## 2017-06-13 NOTE — TELEPHONE ENCOUNTER
hydrALAZINE (APRESOLINE) 25 MG tablet      Last Written Prescription Date: 4/21/17  Last Quantity: 90, # refills: 1  Last Office Visit with [facility], [facility] or [facility] prescribing provider: 4/25/17  Next 5 appointments (look out 90 days)     Jun 16, 2017 10:20 AM CDT   PHYSICAL with Mag Christianson MD   [facility] ([facility])    [address] [place] [address] [phone]                   Creatinine   Date Value Ref Range Status   04/21/2017 0.79 0.52 - 1.04 mg/dL Final     Lab Results   Component Value Date    AST 18 03/17/2017     Lab Results   Component Value Date    ALT 38 03/17/2017     BP Readings from Last 3 Encounters:   04/25/17 138/80   04/22/17 (!) 152/96   04/21/17 (!) 172/120

## 2017-06-14 RX ORDER — HYDRALAZINE HYDROCHLORIDE 25 MG/1
TABLET, FILM COATED ORAL
Qty: 90 TABLET | Refills: 0 | Status: SHIPPED | OUTPATIENT
Start: 2017-06-14 | End: 2017-07-22

## 2017-06-16 ENCOUNTER — RADIANT APPOINTMENT (OUTPATIENT)
Dept: MAMMOGRAPHY | Facility: CLINIC | Age: 57
End: 2017-06-16

## 2017-06-16 ENCOUNTER — OFFICE VISIT (OUTPATIENT)
Dept: FAMILY MEDICINE | Facility: CLINIC | Age: 57
End: 2017-06-16

## 2017-06-16 VITALS
SYSTOLIC BLOOD PRESSURE: 136 MMHG | BODY MASS INDEX: 28.34 KG/M2 | WEIGHT: 154 LBS | HEART RATE: 73 BPM | DIASTOLIC BLOOD PRESSURE: 88 MMHG | RESPIRATION RATE: 16 BRPM | HEIGHT: 62 IN | TEMPERATURE: 97 F

## 2017-06-16 DIAGNOSIS — G31.09 FRONTOTEMPORAL DEMENTIA (H): ICD-10-CM

## 2017-06-16 DIAGNOSIS — Z12.31 VISIT FOR SCREENING MAMMOGRAM: ICD-10-CM

## 2017-06-16 DIAGNOSIS — Z12.4 SCREENING FOR MALIGNANT NEOPLASM OF CERVIX: ICD-10-CM

## 2017-06-16 DIAGNOSIS — F02.80 FRONTOTEMPORAL DEMENTIA (H): ICD-10-CM

## 2017-06-16 DIAGNOSIS — Z00.00 ROUTINE GENERAL MEDICAL EXAMINATION AT A HEALTH CARE FACILITY: Primary | ICD-10-CM

## 2017-06-16 DIAGNOSIS — Z12.11 SCREEN FOR COLON CANCER: ICD-10-CM

## 2017-06-16 PROCEDURE — G0202 SCR MAMMO BI INCL CAD: HCPCS | Mod: TC

## 2017-06-16 PROCEDURE — 99396 PREV VISIT EST AGE 40-64: CPT | Performed by: FAMILY MEDICINE

## 2017-06-16 PROCEDURE — 87624 HPV HI-RISK TYP POOLED RSLT: CPT | Performed by: FAMILY MEDICINE

## 2017-06-16 PROCEDURE — G0145 SCR C/V CYTO,THINLAYER,RESCR: HCPCS | Performed by: FAMILY MEDICINE

## 2017-06-16 NOTE — NURSING NOTE
"Chief Complaint   Patient presents with     Physical       Initial /88  Pulse 73  Temp 97  F (36.1  C) (Tympanic)  Resp 16  Ht 5' 2\" (1.575 m)  Wt 154 lb (69.9 kg)  LMP 06/01/2012  BMI 28.17 kg/m2 Estimated body mass index is 28.17 kg/(m^2) as calculated from the following:    Height as of this encounter: 5' 2\" (1.575 m).    Weight as of this encounter: 154 lb (69.9 kg).  Medication Reconciliation: complete     Margarette Bocanegra CMA      "

## 2017-06-16 NOTE — MR AVS SNAPSHOT
After Visit Summary   6/16/2017    Ирина Rahman    MRN: [id number]           Patient Information     Date Of Birth          1960        Visit Information        Provider Department      6/16/2017 10:20 AM Mag Christianson MD [facility]        Today's Diagnoses     Routine general medical examination at a health care facility    -  1    Frontotemporal dementia        Screen for colon cancer        Visit for screening mammogram        Screening for malignant neoplasm of cervix          Care Instructions      Preventive Health Recommendations  Female Ages 50 - 64    Yearly exam: See your health care provider every year in order to  o Review health changes.   o Discuss preventive care.    o Review your medicines if your doctor has prescribed any.      Get a Pap test every three years (unless you have an abnormal result and your provider advises testing more often).    If you get Pap tests with HPV test, you only need to test every 5 years, unless you have an abnormal result.     You do not need a Pap test if your uterus was removed (hysterectomy) and you have not had cancer.    You should be tested each year for STDs (sexually transmitted diseases) if you're at risk.     Have a mammogram every 1 to 2 years.    Have a colonoscopy at age 50, or have a yearly FIT test (stool test). These exams screen for colon cancer.      Have a cholesterol test every 5 years, or more often if advised.    Have a diabetes test (fasting glucose) every three years. If you are at risk for diabetes, you should have this test more often.     If you are at risk for osteoporosis (brittle bone disease), think about having a bone density scan (DEXA).    Shots: Get a flu shot each year. Get a tetanus shot every 10 years.    Nutrition:     Eat at least 5 servings of fruits and vegetables each day.    Eat whole-grain bread, whole-wheat pasta and brown rice instead of white grains and  "rice.    Talk to your provider about Calcium and Vitamin D.     Lifestyle    Exercise at least 150 minutes a week (30 minutes a day, 5 days a week). This will help you control your weight and prevent disease.    Limit alcohol to one drink per day.    No smoking.     Wear sunscreen to prevent skin cancer.     See your dentist every six months for an exam and cleaning.    See your eye doctor every 1 to 2 years.            Follow-ups after your visit        Who to contact     If you have questions or need follow up information about today's clinic visit or your schedule please contact [facility] directly at [phone].  Normal or non-critical lab and imaging results will be communicated to you by Think Realtimehart, letter or phone within 4 business days after the clinic has received the results. If you do not hear from us within 7 days, please contact the clinic through [employer] Real[employer] or phone. If you have a critical or abnormal lab result, we will notify you by phone as soon as possible.  Submit refill requests through [employer] or call your pharmacy and they will forward the refill request to us. Please allow 3 business days for your refill to be completed.          Additional Information About Your Visit        Think RealtimeharTreatful Information     [employer] lets you send messages to your doctor, view your test results, renew your prescriptions, schedule appointments and more. To sign up, go to [email or web address] . Click on \"Log in\" on the left side of the screen, which will take you to the Welcome page. Then click on \"Sign up Now\" on the right side of the page.     You will be asked to enter the access code listed below, as well as some personal information. Please follow the directions to create your username and password.     Your access code is: [email or web address]  Expires: 2017  6:03 PM     Your access code will  in 90 days. If you need help or a new code, please call your [facility] or " "[phone].        Care EveryWhere ID     This is your Care EveryWhere ID. This could be used by other organizations to access your [facility] medical records  [id number]        Your Vitals Were     Pulse Temperature Respirations Height Last Period BMI (Body Mass Index)    73 97  F (36.1  C) (Tympanic) 16 5' 2\" (1.575 m) 06/01/2012 28.17 kg/m2       Blood Pressure from Last 3 Encounters:   06/16/17 136/88   04/25/17 138/80   04/22/17 (!) 152/96    Weight from Last 3 Encounters:   06/16/17 154 lb (69.9 kg)   04/25/17 164 lb (74.4 kg)   04/22/17 164 lb (74.4 kg)              We Performed the Following     HPV High Risk Types DNA Cervical     Pap imaged thin layer screen with HPV - recommended age 30 - 65 years (select HPV order below)        Primary Care Provider Office Phone # Fax #    Mag Christianson [phone]       [address] [address]        Thank you!     Thank you for choosing [facility]  for your care. Our goal is always to provide you with excellent care. Hearing back from our patients is one way we can continue to improve our services. Please take a few minutes to complete the written survey that you may receive in the mail after your visit with us. Thank you!             Your Updated Medication List - Protect others around you: Learn how to safely use, store and throw away your medicines at www.disposemymeds.org.          This list is accurate as of: 6/16/17  6:03 PM.  Always use your most recent med list.                   Brand Name Dispense Instructions for use    amLODIPine 5 MG tablet    NORVASC    90 tablet    Take 1 tablet (5 mg) by mouth daily       hydrALAZINE 25 MG tablet    APRESOLINE    90 tablet    TAKE 1/2 TO 1 TABLET(12.5 TO 25 MG) BY MOUTH THREE TIMES DAILY       losartan-hydrochlorothiazide 100-25 MG per tablet    HYZAAR    30 tablet    Take 1 tablet by mouth daily       UNKNOWN TO PATIENT "

## 2017-06-16 NOTE — LETTER
June 28, 2017    Ирина Rahman  [address]  [place] [address]    Dear Ирина,  We are happy to inform you that your PAP smear result from 06/16/17 is normal.  We are now able to do a follow up test on PAP smears. The DNA test is for HPV (Human Papilloma Virus). Cervical cancer is closely linked with certain types of HPV. Your result showed no evidence of high risk HPV.  Therefore we recommend you return in 3 years for your next pap smear.  You will still need to return to the clinic every year for an annual exam and other preventive tests.  Please contact the clinic at [phone] with any questions.  Sincerely,    Mag Christianson MD/rima

## 2017-06-16 NOTE — PROGRESS NOTES
SUBJECTIVE:     CC: Ирина Rahman is an 57 year old woman who presents for preventive health visit.     Healthy Habits:    Do you get at least three servings of calcium containing foods daily (dairy, green leafy vegetables, etc.)? yes    Amount of exercise or daily activities, outside of work: 7 day(s) per week- takes dog for a walk    Problems taking medications regularly No    Medication side effects: fatigue    Have you had an eye exam in the past two years? no    Do you see a dentist twice per year? yes    Do you have sleep apnea, excessive snoring or daytime drowsiness?no        Today's PHQ-2 Score:   PHQ-2 ( 1999 Pf[employer]) 4/22/2017 4/18/2016   Q1: Little interest or pleasure in doing things 0 0   Q2: Feeling down, depressed or hopeless 0 0   PHQ-2 Score 0 0       Abuse: Current or Past(Physical, Sexual or Emotional)- No  Do you feel safe in your environment - Yes    Social History   Substance Use Topics     Smoking status: Never Smoker     Smokeless tobacco: Never Used     Alcohol use 0.0 oz/week     0 Standard drinks or equivalent per week      Comment: wine occ     The patient does not drink >3 drinks per day nor >7 drinks per week.    Recent Labs   Lab Test  03/23/15   1044  06/07/12   0843   CHOL  200*  230*   HDL  51  50   LDL  123  150*   TRIG  131  147   CHOLHDLRATIO  3.9  4.6       Reviewed orders with patient.  Reviewed health maintenance and updated orders accordingly - Yes    Mammo Decision Support:  Patient over age 50, mutual decision to screen reflected in health maintenance.    Pertinent mammograms are reviewed under the imaging tab.  History of abnormal Pap smear: NO - age 30- 65 PAP every 3 years recommended    Reviewed and updated as needed this visit by clinical staff  Tobacco  Allergies  Meds  Med Hx  Surg Hx  Fam Hx  Soc Hx        Reviewed and updated as needed this visit by Provider            ROS:  C: NEGATIVE for fever, chills, change in weight  I: NEGATIVE for worrisome  "rashes, moles or lesions  E: NEGATIVE for vision changes or irritation  ENT: NEGATIVE for ear, mouth and throat problems  R: NEGATIVE for significant cough or SOB  B: NEGATIVE for masses, tenderness or discharge  CV: NEGATIVE for chest pain, palpitations or peripheral edema  GI: NEGATIVE for nausea, abdominal pain, heartburn, or change in bowel habits  : NEGATIVE for unusual urinary or vaginal symptoms. No vaginal bleeding.  M: NEGATIVE for significant arthralgias or myalgia  N: NEGATIVE for weakness, dizziness or paresthesias  P: NEGATIVE for changes in mood or affect     Problem list, Medication list, Allergies, and Medical/Social/Surgical histories reviewed in [facility] and updated as appropriate.  Labs reviewed in EPIC  OBJECTIVE:     /88  Pulse 73  Temp 97  F (36.1  C) (Tympanic)  Resp 16  Ht 5' 2\" (1.575 m)  Wt 154 lb (69.9 kg)  LMP 06/01/2012  BMI 28.17 kg/m2  EXAM:  GENERAL: healthy, alert and no distress  EYES: Eyes grossly normal to inspection, PERRL and conjunctivae and sclerae normal  HENT: ear canals and TM's normal, nose and mouth without ulcers or lesions  NECK: no adenopathy, no asymmetry, masses, or scars and thyroid normal to palpation  RESP: lungs clear to auscultation - no rales, rhonchi or wheezes  BREAST: normal without masses, tenderness or nipple discharge and no palpable axillary masses or adenopathy  CV: regular rate and rhythm, normal S1 S2, no S3 or S4, no murmur, click or rub, no peripheral edema and peripheral pulses strong  ABDOMEN: soft, nontender, no hepatosplenomegaly, no masses and bowel sounds normal   (female): normal female external genitalia, normal urethral meatus, vaginal mucosa pink, moist, well rugated, and normal cervix/adnexa/uterus without masses or discharge  MS: no gross musculoskeletal defects noted, no edema  SKIN: no suspicious lesions or rashes  NEURO: Normal strength and tone, mentation intact and speech normal  PSYCH: affect flat, no emotions when " "discussing frontotemporal lobe dementia, uninhibited with physical exam    ASSESSMENT/PLAN:     Ирина was seen today for physical.    Diagnoses and all orders for this visit:    Routine general medical examination at a health care facility  -     HPV High Risk Types DNA Cervical    Frontotemporal dementia  Comments:  Going to [place] in August.  Seems to be rapidly progressing; change since my last visit with her.  Offered support to Ирина and .    Screen for colon cancer  Comments:  Considering dx of frontotemporal dementia, will hold off on colon cancer screen q 3 yrs now    Visit for screening mammogram    Screening for malignant neoplasm of cervix  -     Pap imaged thin layer screen with HPV - recommended age 30 - 65 years (select HPV order below)    Other orders  -     Cancel: MA SCREENING DIGITAL BILAT - Future  (s+30); Future        COUNSELING:   Reviewed preventive health counseling, as reflected in patient instructions         reports that she has never smoked. She has never used smokeless tobacco.    Estimated body mass index is 28.17 kg/(m^2) as calculated from the following:    Height as of this encounter: 5' 2\" (1.575 m).    Weight as of this encounter: 154 lb (69.9 kg).   Weight management plan: Discussed healthy diet and exercise guidelines and patient will follow up in 12 months in clinic to re-evaluate.    Counseling Resources:  ATP IV Guidelines  Pooled Cohorts Equation Calculator  Breast Cancer Risk Calculator  FRAX Risk Assessment  ICSI Preventive Guidelines  Dietary Guidelines for Americans, 2010  USDA's MyPlate  ASA Prophylaxis  Lung CA Screening    Mag Christianson MD  [facility]  "

## 2017-06-20 LAB
COPATH REPORT: NORMAL
PAP: NORMAL

## 2017-06-21 LAB
FINAL DIAGNOSIS: NORMAL
HPV HR 12 DNA CVX QL NAA+PROBE: NEGATIVE
HPV16 DNA SPEC QL NAA+PROBE: NEGATIVE
HPV18 DNA SPEC QL NAA+PROBE: NEGATIVE
SPECIMEN DESCRIPTION: NORMAL

## 2017-07-22 DIAGNOSIS — I16.0 HYPERTENSIVE URGENCY: ICD-10-CM

## 2017-07-22 DIAGNOSIS — I10 BENIGN ESSENTIAL HYPERTENSION: ICD-10-CM

## 2017-07-23 NOTE — TELEPHONE ENCOUNTER
HYDRALAZINE 25MG TABLETS (ORANGE)  Last Written Prescription Date: 6/14/17  Last Fill Quantity: 90, # refills: 0    Last Office Visit with [facility], [facility] or [facility] prescribing provider:  6/16/17   Future Office Visit: None        BP Readings from Last 3 Encounters:   06/16/17 136/88   04/25/17 138/80   04/22/17 (!) 152/96       LOSARTAN/HCTZ 100/25MG TABLETS   Last Written Prescription Date: 11/1/16  Last Fill Quantity: 30, # refills: 5  Last Office Visit with [facility], [facility] or [facility] prescribing provider: 6/16/17       Potassium   Date Value Ref Range Status   04/21/2017 3.5 3.4 - 5.3 mmol/L Final     Creatinine   Date Value Ref Range Status   04/21/2017 0.79 0.52 - 1.04 mg/dL Final     BP Readings from Last 3 Encounters:   06/16/17 136/88   04/25/17 138/80   04/22/17 (!) 152/96

## 2017-07-24 NOTE — TELEPHONE ENCOUNTER
"Routing refill request to provider for review/approval because:  Drug interaction warning--red dye allergy \"swelling\"        "

## 2017-07-26 RX ORDER — LOSARTAN POTASSIUM AND HYDROCHLOROTHIAZIDE 25; 100 MG/1; MG/1
TABLET ORAL
Qty: 90 TABLET | Refills: 2 | Status: SHIPPED | OUTPATIENT
Start: 2017-07-26 | End: 2018-05-15

## 2017-07-26 RX ORDER — HYDRALAZINE HYDROCHLORIDE 25 MG/1
TABLET, FILM COATED ORAL
Qty: 270 TABLET | Refills: 2 | Status: SHIPPED | OUTPATIENT
Start: 2017-07-26 | End: 2017-11-11

## 2017-08-08 ENCOUNTER — TRANSFERRED RECORDS (OUTPATIENT)
Dept: HEALTH INFORMATION MANAGEMENT | Facility: CLINIC | Age: 57
End: 2017-08-08

## 2017-08-29 ENCOUNTER — OFFICE VISIT (OUTPATIENT)
Dept: FAMILY MEDICINE | Facility: CLINIC | Age: 57
End: 2017-08-29
Payer: COMMERCIAL

## 2017-08-29 VITALS
RESPIRATION RATE: 16 BRPM | OXYGEN SATURATION: 97 % | DIASTOLIC BLOOD PRESSURE: 74 MMHG | BODY MASS INDEX: 25.61 KG/M2 | TEMPERATURE: 98 F | SYSTOLIC BLOOD PRESSURE: 114 MMHG | HEART RATE: 67 BPM | WEIGHT: 140 LBS

## 2017-08-29 DIAGNOSIS — G31.09 FRONTOTEMPORAL DEMENTIA (H): Primary | ICD-10-CM

## 2017-08-29 DIAGNOSIS — I10 BENIGN ESSENTIAL HYPERTENSION: ICD-10-CM

## 2017-08-29 DIAGNOSIS — F02.80 FRONTOTEMPORAL DEMENTIA (H): Primary | ICD-10-CM

## 2017-08-29 DIAGNOSIS — T22.221A PARTIAL THICKNESS BURN OF RIGHT ELBOW, INITIAL ENCOUNTER: ICD-10-CM

## 2017-08-29 PROCEDURE — 99213 OFFICE O/P EST LOW 20 MIN: CPT | Performed by: FAMILY MEDICINE
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
## 2017-08-29 NOTE — NURSING NOTE
"Chief Complaint   Patient presents with     Burn       Initial /74  Pulse 67  Temp 98  F (36.7  C) (Tympanic)  Resp 16  Wt 140 lb (63.5 kg)  LMP 06/01/2012  SpO2 97%  BMI 25.61 kg/m2 Estimated body mass index is 25.61 kg/(m^2) as calculated from the following:    Height as of 6/16/17: 5' 2\" (1.575 m).    Weight as of this encounter: 140 lb (63.5 kg).  Medication Reconciliation: complete   .Heriberto MEJÍA      "
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
## 2017-08-30 PROBLEM — T22.221A: Status: ACTIVE | Noted: 2017-08-30

## 2017-08-30 RX ORDER — SILVER SULFADIAZINE 10 MG/G
CREAM TOPICAL 2 TIMES DAILY
Qty: 85 G | Refills: 1 | Status: SHIPPED | OUTPATIENT
Start: 2017-08-30 | End: 2017-09-06

## 2017-08-30 RX ORDER — AMLODIPINE BESYLATE 5 MG/1
5 TABLET ORAL DAILY
Qty: 90 TABLET | Refills: 1 | Status: SHIPPED | OUTPATIENT
Start: 2017-08-30 | End: 2018-08-24

## 2017-09-24 ENCOUNTER — HEALTH MAINTENANCE LETTER (OUTPATIENT)
Age: 57
End: 2017-09-24

## 2017-11-01 ENCOUNTER — TELEPHONE (OUTPATIENT)
Dept: FAMILY MEDICINE | Facility: CLINIC | Age: 57
End: 2017-11-01

## 2017-11-01 NOTE — TELEPHONE ENCOUNTER
Our goal is to have forms completed within 72 hours, however some forms may require a visit or additional information.    What clinic location was the form placed at [facility] or [place].?     Who is the form from? [facility]  Where did the form come from? Faxed to clinic   The form was placed in the inbox of Mag Christianson MD      Please fax to [phone]    Additional comments:     Call take on 11/1/2017 at 2:08 PM by Maral Christianson

## 2017-11-09 ENCOUNTER — MEDICAL CORRESPONDENCE (OUTPATIENT)
Dept: HEALTH INFORMATION MANAGEMENT | Facility: CLINIC | Age: 57
End: 2017-11-09

## 2017-11-11 ENCOUNTER — OFFICE VISIT (OUTPATIENT)
Dept: FAMILY MEDICINE | Facility: CLINIC | Age: 57
End: 2017-11-11
Payer: MEDICAID

## 2017-11-11 VITALS
OXYGEN SATURATION: 98 % | BODY MASS INDEX: 25.33 KG/M2 | DIASTOLIC BLOOD PRESSURE: 62 MMHG | RESPIRATION RATE: 16 BRPM | TEMPERATURE: 98.4 F | SYSTOLIC BLOOD PRESSURE: 116 MMHG | HEART RATE: 76 BPM | WEIGHT: 138.5 LBS

## 2017-11-11 DIAGNOSIS — I10 BENIGN ESSENTIAL HYPERTENSION: ICD-10-CM

## 2017-11-11 DIAGNOSIS — D12.6 TUBULAR ADENOMA OF COLON: ICD-10-CM

## 2017-11-11 DIAGNOSIS — G31.09 FRONTOTEMPORAL DEMENTIA (H): Primary | ICD-10-CM

## 2017-11-11 DIAGNOSIS — F02.80 FRONTOTEMPORAL DEMENTIA (H): Primary | ICD-10-CM

## 2017-11-11 DIAGNOSIS — F33.41 RECURRENT MAJOR DEPRESSIVE DISORDER, IN PARTIAL REMISSION (H): ICD-10-CM

## 2017-11-11 PROBLEM — T22.221A: Status: RESOLVED | Noted: 2017-08-30 | Resolved: 2017-11-11

## 2017-11-11 PROCEDURE — 90471 IMMUNIZATION ADMIN: CPT | Performed by: FAMILY MEDICINE

## 2017-11-11 PROCEDURE — 99214 OFFICE O/P EST MOD 30 MIN: CPT | Mod: 25 | Performed by: FAMILY MEDICINE

## 2017-11-11 PROCEDURE — 90686 IIV4 VACC NO PRSV 0.5 ML IM: CPT | Performed by: FAMILY MEDICINE

## 2017-11-11 RX ORDER — HYDRALAZINE HYDROCHLORIDE 25 MG/1
TABLET, FILM COATED ORAL
Qty: 270 TABLET | Refills: 2 | Status: CANCELLED | OUTPATIENT
Start: 2017-11-11

## 2017-11-11 ASSESSMENT — ANXIETY QUESTIONNAIRES
6. BECOMING EASILY ANNOYED OR IRRITABLE: MORE THAN HALF THE DAYS
3. WORRYING TOO MUCH ABOUT DIFFERENT THINGS: SEVERAL DAYS
1. FEELING NERVOUS, ANXIOUS, OR ON EDGE: MORE THAN HALF THE DAYS
IF YOU CHECKED OFF ANY PROBLEMS ON THIS QUESTIONNAIRE, HOW DIFFICULT HAVE THESE PROBLEMS MADE IT FOR YOU TO DO YOUR WORK, TAKE CARE OF THINGS AT HOME, OR GET ALONG WITH OTHER PEOPLE: SOMEWHAT DIFFICULT
7. FEELING AFRAID AS IF SOMETHING AWFUL MIGHT HAPPEN: NOT AT ALL
2. NOT BEING ABLE TO STOP OR CONTROL WORRYING: SEVERAL DAYS
5. BEING SO RESTLESS THAT IT IS HARD TO SIT STILL: MORE THAN HALF THE DAYS
GAD7 TOTAL SCORE: 9

## 2017-11-11 ASSESSMENT — PATIENT HEALTH QUESTIONNAIRE - PHQ9
5. POOR APPETITE OR OVEREATING: SEVERAL DAYS
SUM OF ALL RESPONSES TO PHQ QUESTIONS 1-9: 22

## 2017-11-11 NOTE — MR AVS SNAPSHOT
After Visit Summary   11/11/2017    Ирина Rahman    MRN: [id number]           Patient Information     Date Of Birth          1960        Visit Information        Provider Department      11/11/2017 8:20 AM Mag Christianson MD [facility]        Today's Diagnoses     Routine general medical examination at a health care facility    -  1    Recurrent major depressive disorder, in partial remission (H)        Frontotemporal dementia        Hypertensive urgency          Care Instructions    1) Stop your hydralazine.    2) Start prozac 20 mg once a day (or continue what you've already been taking).    3) A [facility] nurse will call to help you set up your Advanced Care Directive.    4) We'll do your flu shot today.    5) I'm happy to write a letter in support of your dog, Walker being and emotional support dog. Mail or fax or email whatever paperwork is needed to us.          Follow-ups after your visit        Additional Services     HONORING JOVANA REFERRAL       Your provider has referred you to [facility] Advance Care Planning Facilitator or Serious illness clinic support staff. The facilitator or support staff will contact you to schedule the appointment or for the follow up call    Reason for Referral: Serious Illness Conversation - ACP Facilitator                  Who to contact     If you have questions or need follow up information about today's clinic visit or your schedule please contact [facility] directly at [phone].  Normal or non-critical lab and imaging results will be communicated to you by MyChart, letter or phone within 4 business days after the clinic has received the results. If you do not hear from us within 7 days, please contact the clinic through MyChart or phone. If you have a critical or abnormal lab result, we will notify you by phone as soon as possible.  Submit refill requests through  "[employer] or call your pharmacy and they will forward the refill request to us. Please allow 3 business days for your refill to be completed.          Additional Information About Your Visit        MyChart Information     [employer] lets you send messages to your doctor, view your test results, renew your prescriptions, schedule appointments and more. To sign up, go to [email or web address] . Click on \"Log in\" on the left side of the screen, which will take you to the Welcome page. Then click on \"Sign up Now\" on the right side of the page.     You will be asked to enter the access code listed below, as well as some personal information. Please follow the directions to create your username and password.     Your access code is: [email or web address]  Expires: 2018  8:44 AM     Your access code will  in 90 days. If you need help or a new code, please call your [facility] clinic or [phone].        Care EveryWhere ID     This is your Care EveryWhere ID. This could be used by other organizations to access your [facility] medical records  [id number]        Your Vitals Were     Pulse Temperature Respirations Last Period Pulse Oximetry BMI (Body Mass Index)    76 98.4  F (36.9  C) (Tympanic) 16 2012 98% 25.33 kg/m2       Blood Pressure from Last 3 Encounters:   17 116/62   17 114/74   17 136/88    Weight from Last 3 Encounters:   17 138 lb 8 oz (62.8 kg)   17 140 lb (63.5 kg)   17 154 lb (69.9 kg)              We Performed the Following     HC FLU VAC PRESRV FREE QUAD SPLIT VIR 3+YRS IM     HONORING CHOICES REFERRAL          Today's Medication Changes          These changes are accurate as of: 17  8:44 AM.  If you have any questions, ask your nurse or doctor.               Start taking these medicines.        Dose/Directions    FLUoxetine 20 MG capsule   Commonly known as:  PROzac   Used for:  Recurrent major depressive disorder, in partial remission (H)   Started by:  " Mag Christianson MD        Dose:  20 mg   Take 1 capsule (20 mg) by mouth daily   Quantity:  90 capsule   Refills:  3         Stop taking these medicines if you haven't already. Please contact your care team if you have questions.     hydrALAZINE 25 MG tablet   Commonly known as:  APRESOLINE   Stopped by:  Mag Christianson MD                Where to get your medicines      These medications were sent to [facility] [address] [place] [address] & [address] [address]     Phone:  [phone]     FLUoxetine 20 MG capsule                Primary Care Provider Office Phone # Fax #    Mag Christianson [phone]       [facility] [address]        Equal Access to Services     FRANCES DE LA ROSA AH: Hadii aad ku hadasho Soomaali, waaxda luqadaha, qaybta kaalmada adeegyada, laura oconnell . So [facility] [phone].    ATENCIÓN: Si habla español, tiene a lamb disposición servicios gratuitos de asistencia lingüística. Llame al [phone].    We comply with applicable federal civil rights laws and [place] laws. We do not discriminate on the basis of race, color, national origin, age, disability, sex, sexual orientation, or gender identity.            Thank you!     Thank you for choosing [facility]  for your care. Our goal is always to provide you with excellent care. Hearing back from our patients is one way we can continue to improve our services. Please take a few minutes to complete the written survey that you may receive in the mail after your visit with us. Thank you!             Your Updated Medication List - Protect others around you: Learn how to safely use, store and throw away your medicines at www.disposemymeds.org.          This list is accurate as of: 11/11/17  8:44 AM.  Always use your most recent med list.                   Brand Name Dispense  Instructions for use Diagnosis    amLODIPine 5 MG tablet    NORVASC    90 tablet    Take 1 tablet (5 mg) by mouth daily    Benign essential hypertension       FLUoxetine 20 MG capsule    PROzac    90 capsule    Take 1 capsule (20 mg) by mouth daily    Recurrent major depressive disorder, in partial remission (H)       losartan-hydrochlorothiazide 100-25 MG per tablet    HYZAAR    90 tablet    TAKE 1 TABLET BY MOUTH DAILY.    Benign essential hypertension       UNKNOWN TO PATIENT

## 2017-11-11 NOTE — NURSING NOTE
"Chief Complaint   Patient presents with     RECHECK       Initial /62  Pulse 76  Temp 98.4  F (36.9  C) (Tympanic)  Resp 16  Wt 138 lb 8 oz (62.8 kg)  LMP 06/01/2012  SpO2 98%  BMI 25.33 kg/m2 Estimated body mass index is 25.33 kg/(m^2) as calculated from the following:    Height as of 6/16/17: 5' 2\" (1.575 m).    Weight as of this encounter: 138 lb 8 oz (62.8 kg).  Medication Reconciliation: complete   .Heriberto MEJÍA      "

## 2017-11-11 NOTE — PROGRESS NOTES
"  SUBJECTIVE:   Ирина Rahman is a 57 year old female who presents to clinic today for the following health issues:        Abnormal Mood Symptoms      Duration: 1 1/2 month    Description:  Depression: YES  Anxiety: YES  Panic attacks: no     Accompanying signs and symptoms: see PHQ-9 and LIZ scores    History (similar episodes/previous evaluation): None    Precipitating or alleviating factors: None    Therapies tried and outcome: Prozac (Fluoxetine)    57 year old female with recent diagnosis of frontotemporal dementia, here with  [place] for few concerns.    1) Depression.  He started her on his prozac 1 month ago 20 mg daily due to her crying episodes, aphasia.  It has \"helped tremendously\".  Much less anxiety, crying, and even helped with incontinence issues.  Can she continue?    2) Blood pressure medications - do they still need all 3 ?  She has lost close to 25 lbs in last 6 months.  Walking more, appetite down.    3) Can she get flu shot?    Wt Readings from Last 4 Encounters:   11/11/17 138 lb 8 oz (62.8 kg)   08/29/17 140 lb (63.5 kg)   06/16/17 154 lb (69.9 kg)   04/25/17 164 lb (74.4 kg)       BP Readings from Last 3 Encounters:   11/11/17 116/62   08/29/17 114/74   06/16/17 136/88       Problem list and histories reviewed & adjusted, as indicated.  Additional history: as documented    Patient Active Problem List   Diagnosis     Benign essential hypertension     Family history of genetic disease carrier     CARDIOVASCULAR SCREENING; LDL GOAL LESS THAN 160     Family history of Alzheimer's disease     Tubular adenoma of colon     Frontotemporal dementia     Past Surgical History:   Procedure Laterality Date     fracture left hand  2000    can't flip hand all the way over     GENITOURINARY SURGERY  2008    had incontinence surgery     HYSTEROSCOPIC PLACEMENT CONTRACEPTIVE DEVICE  1998     HYSTEROSCOPIC PLACEMENT CONTRACEPTIVE DEVICE  2003    Reversal     OPEN REDUCTION INTERNAL FIXATION FIBULA Left " 2/2016     OPEN REDUCTION INTERNAL FIXATION TIBIA Left 2/2016       Social History   Substance Use Topics     Smoking status: Never Smoker     Smokeless tobacco: Never Used     Alcohol use 0.0 oz/week     0 Standard drinks or equivalent per week      Comment: wine occ     Family History   Problem Relation Age of Onset     Hypertension Mother      Alzheimer Disease Mother      Thyroid Disease Mother      DIABETES Brother      Alcohol/Drug Brother      Alzheimer Disease Maternal Grandmother      Depression Brother      Genetic Disorder Father      ALS     Genetic Disorder Paternal Grandfather      ALS     Psychotic Disorder Brother              Reviewed and updated as needed this visit by clinical staffTobacco  Allergies  Meds  Med Hx  Surg Hx  Fam Hx  Soc Hx      Reviewed and updated as needed this visit by Provider         ROS:  Constitutional, HEENT, cardiovascular, pulmonary, gi and gu systems are negative, except as otherwise noted.      OBJECTIVE:   /62  Pulse 76  Temp 98.4  F (36.9  C) (Tympanic)  Resp 16  Wt 138 lb 8 oz (62.8 kg)  LMP 06/01/2012  SpO2 98%  BMI 25.33 kg/m2  Body mass index is 25.33 kg/(m^2).  GENERAL: healthy, alert and no distress  MS: no gross musculoskeletal defects noted, no edema  NEURO: Normal strength and tone, sensory exam grossly normal and abnormal mental status - interrupts conversation, socially uninhibited, repeats requests for her flu shot, repeats questions that I just answered multiple times, interrupts to tell me she had her hair done recently, her nails, etc.    ASSESSMENT/PLAN:       Ирина was seen today for recheck.    Diagnoses and all orders for this visit:    Frontotemporal dementia  -     HONORING CHOICES REFERRAL    Recurrent major depressive disorder, in partial remission (H)  -     FLUoxetine (PROZAC) 20 MG capsule; Take 1 capsule (20 mg) by mouth daily    Benign essential hypertension    Tubular adenoma of colon  Comments:  At this point will not  continue q 3 year colonoscopy due to new degenerative brain disease    Other orders  -     Cancel: hydrALAZINE (APRESOLINE) 25 MG tablet;   -     HC FLU VAC PRESRV FREE QUAD SPLIT VIR 3+YRS IM        Patient Instructions   1) Stop your hydralazine.    2) Start prozac 20 mg once a day (or continue what you've already been taking).    3) A [facility] nurse will call to help you set up your Advanced Care Directive.    4) We'll do your flu shot today.    5) I'm happy to write a letter in support of your dog, Walker being and emotional support dog. Mail or fax or email whatever paperwork is needed to us.      Mag Christianson MD  [facility]

## 2017-11-11 NOTE — PATIENT INSTRUCTIONS
1) Stop your hydralazine.    2) Start prozac 20 mg once a day (or continue what you've already been taking).    3) A [facility] nurse will call to help you set up your Advanced Care Directive.    4) We'll do your flu shot today.    5) I'm happy to write a letter in support of your dog, Walker being and emotional support dog. Mail or fax or email whatever paperwork is needed to us.

## 2017-11-12 ASSESSMENT — ANXIETY QUESTIONNAIRES: GAD7 TOTAL SCORE: 9

## 2018-01-10 ENCOUNTER — OFFICE VISIT (OUTPATIENT)
Dept: FAMILY MEDICINE | Facility: CLINIC | Age: 58
End: 2018-01-10
Payer: MEDICAID

## 2018-01-10 VITALS
TEMPERATURE: 99.4 F | HEART RATE: 62 BPM | OXYGEN SATURATION: 97 % | RESPIRATION RATE: 18 BRPM | SYSTOLIC BLOOD PRESSURE: 109 MMHG | HEIGHT: 62 IN | BODY MASS INDEX: 26.13 KG/M2 | WEIGHT: 142 LBS | DIASTOLIC BLOOD PRESSURE: 73 MMHG

## 2018-01-10 DIAGNOSIS — J10.1 INFLUENZA A: Primary | ICD-10-CM

## 2018-01-10 LAB
FLUAV+FLUBV AG SPEC QL: NEGATIVE
FLUAV+FLUBV AG SPEC QL: POSITIVE
SPECIMEN SOURCE: ABNORMAL

## 2018-01-10 PROCEDURE — 87804 INFLUENZA ASSAY W/OPTIC: CPT | Performed by: PHYSICIAN ASSISTANT

## 2018-01-10 PROCEDURE — 99213 OFFICE O/P EST LOW 20 MIN: CPT | Performed by: PHYSICIAN ASSISTANT

## 2018-01-10 RX ORDER — OSELTAMIVIR PHOSPHATE 75 MG/1
75 CAPSULE ORAL 2 TIMES DAILY
Qty: 10 CAPSULE | Refills: 0 | Status: SHIPPED | OUTPATIENT
Start: 2018-01-10 | End: 2018-01-15

## 2018-01-10 NOTE — PROGRESS NOTES
"  SUBJECTIVE:   Ирина Rahman is a 58 year old female who presents to clinic today for the following health issues:    RESPIRATORY SYMPTOMS      Duration: x 2 days    Description  cough, fever, fatigue/malaise and diarrhea    Severity: severe    Accompanying signs and symptoms: None    History (predisposing factors):  none    Precipitating or alleviating factors: None    Therapies tried and outcome:  rest and fluids guaifenesin      HPI additional notes:    Chief Complaint   Patient presents with     URI     Ирина presents today with URI x2-3 days. Patient c/o productive, \"greasy\" cough; rhinorrhea; subjective fever (hasn't taken temperature). Using Robitussin w/o relief. Denies ear pain or pressure, ST, n/v.     ROS:    A Comprehensive greater than 10 system review of systems was carried out.    Pertinent positives and negatives are noted above.  Otherwise negative for contributory information.      Chart Review:  History   Smoking Status     Never Smoker   Smokeless Tobacco     Never Used       Patient Active Problem List   Diagnosis     Benign essential hypertension     Family history of genetic disease carrier     CARDIOVASCULAR SCREENING; LDL GOAL LESS THAN 160     Family history of Alzheimer's disease     Tubular adenoma of colon     Frontotemporal dementia     Past Surgical History:   Procedure Laterality Date     fracture left hand  2000    can't flip hand all the way over     GENITOURINARY SURGERY  2008    had incontinence surgery     HYSTEROSCOPIC PLACEMENT CONTRACEPTIVE DEVICE  1998     HYSTEROSCOPIC PLACEMENT CONTRACEPTIVE DEVICE  2003    Reversal     OPEN REDUCTION INTERNAL FIXATION FIBULA Left 2/2016     OPEN REDUCTION INTERNAL FIXATION TIBIA Left 2/2016     Problem list, Medication list, Allergies, Medical/Social/Surg hx reviewed in [employer], updated as appropriate.   OBJECTIVE:                                                    /73  Pulse 62  Temp 99.4  F (37.4  C) (Oral)  Resp 18  Ht 5' 2\" " (1.575 m)  Wt 142 lb (64.4 kg)  LMP 06/01/2012  SpO2 97%  Breastfeeding? No  BMI 25.97 kg/m2  Body mass index is 25.97 kg/(m^2).  GENERAL:  WDWN, no acute distress  PSYCH: pleasant, cooperative  EYES: no discharge, no injection  HENT:  Normocephalic. Ear canals clear; TMs pearly gray w/o effusion. Nasal mucosa erythematous, clear rhinorrhea. Oropharynx pink, uvula midline, clear PND  NECK:  Supple, symmetric  LUNGS: Good respiratory effort; frequent dry cough. Clear to auscultation bilaterally without rhonchi, rales, or wheeze. Chest rise symmetric and no tenderness to palpation.  HEART:  Regular rate & rhythm. No murmur, gallop, or rub.  EXTREMITIES:  No gross deformities, moves all 4 limbs spontaneously  SKIN:  Warm and dry, no rash or suspicious lesions    NEUROLOGIC: alert, sensation grossly intact.    Diagnostic test results:   Results for orders placed or performed in visit on 01/10/18 (from the past 24 hour(s))   Influenza A/B antigen   Result Value Ref Range    Influenza A/B Agn Specimen Nasopharyngeal     Influenza A Positive (A) NEG^Negative    Influenza B Negative NEG^Negative        ASSESSMENT/PLAN:                                                          ICD-10-CM    1. Influenza A J10.1 Influenza A/B antigen     oseltamivir (TAMIFLU) 75 MG capsule     Positive for influenza A, within window for Tamiflu. Take entire course as prescribed; discussed anticipated clinical course. Tylenol prn fever, body aches. Maintain adequate hydration and get plenty of rest. Ok to return to adult  when fever-free x24 hours.    Please see patient instructions for treatment details.    Follow up in 7-10 days if not improving as anticipated, sooner PRN.    Liss Malcolm PA-C  [facility]

## 2018-01-10 NOTE — MR AVS SNAPSHOT
After Visit Summary   1/10/2018    Ирина Rahman    MRN: [id number]           Patient Information     Date Of Birth          1960        Visit Information        Provider Department      1/10/2018 11:50 AM Liss Malcolm PA-C [facility]        Today's Diagnoses     URI (upper respiratory infection)    -  1    Influenza A          Care Instructions      Influenza (Adult)    Influenza is also called the flu. It is a viral illness that affects the air passages of your lungs. It is different from the common cold. The flu can easily be passed from one to person to another. It may be spread through the air by coughing and sneezing. Or it can be spread by touching the sick person and then touching your own eyes, nose, or mouth.  The flu starts 1 to 3 days after you are exposed to the flu virus. It may last for 1 to 2 weeks but many people feel tired or fatigued for many weeks afterward. You usually don t need to take antibiotics unless you have a complication. This might be an ear or sinus infection or pneumonia.  Symptoms of the flu may be mild or severe. They can include extreme tiredness (wanting to stay in bed all day), chills, fevers, muscle aches, soreness with eye movement, headache, and a dry, hacking cough.  Home care  Follow these guidelines when caring for yourself at home:    Avoid being around cigarette smoke, whether yours or other people s.    Acetaminophen or ibuprofen will help ease your fever, muscle aches, and headache. Don t give aspirin to anyone younger than 18 who has the flu. Aspirin can harm the liver.    Nausea and loss of appetite are common with the flu. Eat light meals. Drink 6 to 8 glasses of liquids every day. Good choices are water, sport drinks, soft drinks without caffeine, juices, tea, and soup. Extra fluids will also help loosen secretions in your nose and lungs.    Over-the-counter cold medicines will not make the flu go away  faster. But the medicines may help with coughing, sore throat, and congestion in your nose and sinuses. Don t use a decongestant if you have high blood pressure.    Stay home until your fever has been gone for at least 24 hours without using medicine to reduce fever.  Follow-up care  Follow up with your healthcare provider, or as advised, if you are not getting better over the next week.  If you are age 65 or older, talk with your provider about getting a pneumococcal vaccine every 5 years. You should also get this vaccine if you have chronic asthma or COPD. All adults should get a flu vaccine every fall. Ask your provider about this.  When to seek medical advice  Call your healthcare provider right away if any of these occur:    Cough with lots of colored mucus (sputum) or blood in your mucus    Chest pain, shortness of breath, wheezing, or trouble breathing    Severe headache, or face, neck, or ear pain    New rash with fever    Fever of 100.4 F (38 C) or higher, or as directed by your healthcare provider    Confusion, behavior change, or seizure    Severe weakness or dizziness    You get a new fever or cough after getting better for a few days  Date Last Reviewed: 1/1/2017 2000-2017 [employer]. [address] [place] [address]. All rights reserved. This information is not intended as a substitute for professional medical care. Always follow your healthcare professional's instructions.                Follow-ups after your visit        Who to contact     If you have questions or need follow up information about today's clinic visit or your schedule please contact [facility] directly at [phone].  Normal or non-critical lab and imaging results will be communicated to you by MyChart, letter or phone within 4 business days after the clinic has received the results. If you do not hear from us within 7 days, please contact the clinic through [employer] or  "phone. If you have a critical or abnormal lab result, we will notify you by phone as soon as possible.  Submit refill requests through [employer] or call your pharmacy and they will forward the refill request to us. Please allow 3 business days for your refill to be completed.          Additional Information About Your Visit        [employer] Information     [employer] lets you send messages to your doctor, view your test results, renew your prescriptions, schedule appointments and more. To sign up, go to [email or web address] . Click on \"Log in\" on the left side of the screen, which will take you to the Welcome page. Then click on \"Sign up Now\" on the right side of the page.     You will be asked to enter the access code listed below, as well as some personal information. Please follow the directions to create your username and password.     Your access code is: [email or web address]  Expires: 2018  8:44 AM     Your access code will  in 90 days. If you need help or a new code, please call your [facility] clinic or [phone].        Care EveryWhere ID     This is your Care EveryWhere ID. This could be used by other organizations to access your [place] medical records  [id number]        Your Vitals Were     Pulse Temperature Respirations Height Last Period Pulse Oximetry    62 99.4  F (37.4  C) (Oral) 18 5' 2\" (1.575 m) 2012 97%    Breastfeeding? BMI (Body Mass Index)                No 25.97 kg/m2           Blood Pressure from Last 3 Encounters:   01/10/18 109/73   17 116/62   17 114/74    Weight from Last 3 Encounters:   01/10/18 142 lb (64.4 kg)   17 138 lb 8 oz (62.8 kg)   17 140 lb (63.5 kg)              We Performed the Following     Influenza A/B antigen          Today's Medication Changes          These changes are accurate as of: 1/10/18 12:34 PM.  If you have any questions, ask your nurse or doctor.               Start taking these medicines.        Dose/Directions    oseltamivir " 75 MG capsule   Commonly known as:  TAMIFLU   Used for:  Influenza A   Started by:  Liss Malcolm PA-C        Dose:  75 mg   Take 1 capsule (75 mg) by mouth 2 times daily for 5 days   Quantity:  10 capsule   Refills:  0            Where to get your medicines      These medications were sent to [id number] - [address] AT [address] & [address] [address]     Phone:  [phone]     oseltamivir 75 MG capsule                Primary Care Provider Office Phone # Fax #    Mag Iveth Christianson [phone] [address] [facility] [address]        Equal Access to Services     FRANCES DE LA ROSA : Melva Juárez, warhonda rodarte, qarafi kaalmamelvin virk, laura valenzuela. So [facility] [phone].    ATENCIÓN: Si habla español, tiene a lamb disposición servicios gratuitos de asistencia lingüística. Llame al [phone].    We comply with applicable federal civil rights laws and [place] laws. We do not discriminate on the basis of race, color, national origin, age, disability, sex, sexual orientation, or gender identity.            Thank you!     Thank you for choosing [facility]  for your care. Our goal is always to provide you with excellent care. Hearing back from our patients is one way we can continue to improve our services. Please take a few minutes to complete the written survey that you may receive in the mail after your visit with us. Thank you!             Your Updated Medication List - Protect others around you: Learn how to safely use, store and throw away your medicines at www.disposemymeds.org.          This list is accurate as of: 1/10/18 12:34 PM.  Always use your most recent med list.                   Brand Name Dispense Instructions for use Diagnosis    amLODIPine 5 MG tablet    NORVASC    90 tablet    Take 1 tablet (5 mg) by mouth  daily    Benign essential hypertension       FLUoxetine 20 MG capsule    PROzac    90 capsule    Take 1 capsule (20 mg) by mouth daily    Recurrent major depressive disorder, in partial remission (H)       losartan-hydrochlorothiazide 100-25 MG per tablet    HYZAAR    90 tablet    TAKE 1 TABLET BY MOUTH DAILY.    Benign essential hypertension       oseltamivir 75 MG capsule    TAMIFLU    10 capsule    Take 1 capsule (75 mg) by mouth 2 times daily for 5 days    Influenza A       UNKNOWN TO PATIENT

## 2018-01-10 NOTE — NURSING NOTE
"Chief Complaint   Patient presents with     URI     /73  Pulse 62  Temp 99.4  F (37.4  C) (Oral)  Resp 18  Ht 5' 2\" (1.575 m)  Wt 142 lb (64.4 kg)  LMP 06/01/2012  SpO2 97%  Breastfeeding? No  BMI 25.97 kg/m2 Estimated body mass index is 25.97 kg/(m^2) as calculated from the following:    Height as of this encounter: 5' 2\" (1.575 m).    Weight as of this encounter: 142 lb (64.4 kg).  BP completed using cuff size: regular   Azeb Dominguez CMA    Health Maintenance Due   Topic Date Due     ADVANCE DIRECTIVE PLANNING Q5 YRS  01/03/2016     Health Maintenance reviewed at today's visit patient asked to schedule/complete:   None, Health Maintenance up to date.    "

## 2018-01-10 NOTE — PATIENT INSTRUCTIONS
Influenza (Adult)    Influenza is also called the flu. It is a viral illness that affects the air passages of your lungs. It is different from the common cold. The flu can easily be passed from one to person to another. It may be spread through the air by coughing and sneezing. Or it can be spread by touching the sick person and then touching your own eyes, nose, or mouth.  The flu starts 1 to 3 days after you are exposed to the flu virus. It may last for 1 to 2 weeks but many people feel tired or fatigued for many weeks afterward. You usually don t need to take antibiotics unless you have a complication. This might be an ear or sinus infection or pneumonia.  Symptoms of the flu may be mild or severe. They can include extreme tiredness (wanting to stay in bed all day), chills, fevers, muscle aches, soreness with eye movement, headache, and a dry, hacking cough.  Home care  Follow these guidelines when caring for yourself at home:    Avoid being around cigarette smoke, whether yours or other people s.    Acetaminophen or ibuprofen will help ease your fever, muscle aches, and headache. Don t give aspirin to anyone younger than 18 who has the flu. Aspirin can harm the liver.    Nausea and loss of appetite are common with the flu. Eat light meals. Drink 6 to 8 glasses of liquids every day. Good choices are water, sport drinks, soft drinks without caffeine, juices, tea, and soup. Extra fluids will also help loosen secretions in your nose and lungs.    Over-the-counter cold medicines will not make the flu go away faster. But the medicines may help with coughing, sore throat, and congestion in your nose and sinuses. Don t use a decongestant if you have high blood pressure.    Stay home until your fever has been gone for at least 24 hours without using medicine to reduce fever.  Follow-up care  Follow up with your healthcare provider, or as advised, if you are not getting better over the next week.  If you are age 65 or  older, talk with your provider about getting a pneumococcal vaccine every 5 years. You should also get this vaccine if you have chronic asthma or COPD. All adults should get a flu vaccine every fall. Ask your provider about this.  When to seek medical advice  Call your healthcare provider right away if any of these occur:    Cough with lots of colored mucus (sputum) or blood in your mucus    Chest pain, shortness of breath, wheezing, or trouble breathing    Severe headache, or face, neck, or ear pain    New rash with fever    Fever of 100.4 F (38 C) or higher, or as directed by your healthcare provider    Confusion, behavior change, or seizure    Severe weakness or dizziness    You get a new fever or cough after getting better for a few days  Date Last Reviewed: 1/1/2017 2000-2017 [employer]. [address] [place] [address], [place] [address]. All rights reserved. This information is not intended as a substitute for professional medical care. Always follow your healthcare professional's instructions.

## 2018-01-24 ENCOUNTER — TELEPHONE (OUTPATIENT)
Dept: FAMILY MEDICINE | Facility: CLINIC | Age: 58
End: 2018-01-24

## 2018-03-16 PROBLEM — F33.41 RECURRENT MAJOR DEPRESSIVE DISORDER, IN PARTIAL REMISSION (H): Status: ACTIVE | Noted: 2018-03-16

## 2018-04-26 ENCOUNTER — TELEPHONE (OUTPATIENT)
Dept: FAMILY MEDICINE | Facility: CLINIC | Age: 58
End: 2018-04-26

## 2018-04-26 NOTE — TELEPHONE ENCOUNTER
Our goal is to have forms completed within 72 hours, however some forms may require a visit or additional information.    What clinic location was the form placed at [facility] or [place].?     Who is the form from? [facility] Verification  Where did the form come from? Faxed to clinic   The form was placed in the inbox of Mag Christianson MD      Please fax to [phone]    Additional comments:     Call take on 4/26/2018 at 11:10 AM by Maral Christianson

## 2018-05-15 ENCOUNTER — OFFICE VISIT (OUTPATIENT)
Dept: FAMILY MEDICINE | Facility: CLINIC | Age: 58
End: 2018-05-15
Payer: MEDICAID

## 2018-05-15 ENCOUNTER — PATIENT OUTREACH (OUTPATIENT)
Dept: CARE COORDINATION | Facility: CLINIC | Age: 58
End: 2018-05-15

## 2018-05-15 VITALS
WEIGHT: 154.5 LBS | SYSTOLIC BLOOD PRESSURE: 136 MMHG | TEMPERATURE: 98.4 F | DIASTOLIC BLOOD PRESSURE: 86 MMHG | HEART RATE: 74 BPM | BODY MASS INDEX: 28.26 KG/M2 | OXYGEN SATURATION: 97 %

## 2018-05-15 DIAGNOSIS — F33.41 RECURRENT MAJOR DEPRESSIVE DISORDER, IN PARTIAL REMISSION (H): ICD-10-CM

## 2018-05-15 DIAGNOSIS — F02.80 FRONTOTEMPORAL DEMENTIA (H): ICD-10-CM

## 2018-05-15 DIAGNOSIS — Z78.9 DIFFICULTY WITH ACTIVITIES OF DAILY LIVING: ICD-10-CM

## 2018-05-15 DIAGNOSIS — G31.09 FRONTOTEMPORAL DEMENTIA (H): ICD-10-CM

## 2018-05-15 DIAGNOSIS — R30.0 DYSURIA: Primary | ICD-10-CM

## 2018-05-15 DIAGNOSIS — D12.6 TUBULAR ADENOMA OF COLON: ICD-10-CM

## 2018-05-15 LAB
ALBUMIN UR-MCNC: NEGATIVE MG/DL
APPEARANCE UR: CLEAR
BILIRUB UR QL STRIP: NEGATIVE
COLOR UR AUTO: YELLOW
GLUCOSE UR STRIP-MCNC: NEGATIVE MG/DL
HGB UR QL STRIP: NEGATIVE
KETONES UR STRIP-MCNC: NEGATIVE MG/DL
LEUKOCYTE ESTERASE UR QL STRIP: NEGATIVE
NITRATE UR QL: NEGATIVE
PH UR STRIP: 8.5 PH (ref 5–7)
SOURCE: ABNORMAL
SP GR UR STRIP: 1.02 (ref 1–1.03)
UROBILINOGEN UR STRIP-ACNC: 0.2 EU/DL (ref 0.2–1)

## 2018-05-15 PROCEDURE — 81003 URINALYSIS AUTO W/O SCOPE: CPT | Performed by: FAMILY MEDICINE

## 2018-05-15 PROCEDURE — 99214 OFFICE O/P EST MOD 30 MIN: CPT | Performed by: FAMILY MEDICINE

## 2018-05-15 NOTE — PROGRESS NOTES
"  SUBJECTIVE:   Ирина Rahman is a 58 year old female who presents to clinic today for the following health issues:      URINARY TRACT SYMPTOMS      Duration: increased last few weeks    Description  incontinence    Intensity:  moderate, severe    Accompanying signs and symptoms:  Fever/chills: no   Flank pain no   Nausea and vomiting: no   Vaginal symptoms: none  Abdominal/Pelvic Pain: no     History  History of frequent UTI's: no   History of kidney stones: no   Sexually Active: no   Possibility of pregnancy: No    Precipitating or alleviating factors: None    Therapies tried and outcome: none   Outcome:     Pt here to see what paper work needs to be filled out  for a service/comfort animal    58 year old female with recent diagnosis of frontotemporal dementia, here with  Brenton for few concerns.    1) Depression.  Good on prozac, would like to continue.    2) Medications expensive.  They get at [employer].  Now just on amlodipine and prozac.  Is there a better option?    3) Having much more difficulty with ADLs.  Needs to be reminded to eat slowly.  Wearing Depends and still \"leaves the bathroom a mess\".  Can't be left alone for even a few minutes.  What can they do?  In adult day care 5 days a week, but weekends are hard.  Any other ideas for support?      Wt Readings from Last 4 Encounters:   05/15/18 154 lb 8 oz (70.1 kg)   01/10/18 142 lb (64.4 kg)   11/11/17 138 lb 8 oz (62.8 kg)   08/29/17 140 lb (63.5 kg)       BP Readings from Last 3 Encounters:   05/15/18 136/86   01/10/18 109/73   11/11/17 116/62       Problem list and histories reviewed & adjusted, as indicated.  Additional history: as documented    Patient Active Problem List   Diagnosis     Benign essential hypertension     Family history of genetic disease carrier     CARDIOVASCULAR SCREENING; LDL GOAL LESS THAN 160     Family history of Alzheimer's disease     Tubular adenoma of colon     Frontotemporal dementia     Recurrent major " depressive disorder, in partial remission (H)     Past Surgical History:   Procedure Laterality Date     fracture left hand  2000    can't flip hand all the way over     GENITOURINARY SURGERY  2008    had incontinence surgery     HYSTEROSCOPIC PLACEMENT CONTRACEPTIVE DEVICE  1998     HYSTEROSCOPIC PLACEMENT CONTRACEPTIVE DEVICE  2003    Reversal     OPEN REDUCTION INTERNAL FIXATION FIBULA Left 2/2016     OPEN REDUCTION INTERNAL FIXATION TIBIA Left 2/2016       Social History   Substance Use Topics     Smoking status: Never Smoker     Smokeless tobacco: Never Used     Alcohol use 0.0 oz/week     0 Standard drinks or equivalent per week      Comment: wine occ     Family History   Problem Relation Age of Onset     Hypertension Mother      Alzheimer Disease Mother      Thyroid Disease Mother      DIABETES Brother      Alcohol/Drug Brother      Alzheimer Disease Maternal Grandmother      Depression Brother      Genetic Disorder Father      ALS     Genetic Disorder Paternal Grandfather      ALS     Psychotic Disorder Brother      Coronary Artery Disease No family hx of      Hyperlipidemia No family hx of      CEREBROVASCULAR DISEASE No family hx of      Breast Cancer No family hx of      Colon Cancer No family hx of      Prostate Cancer No family hx of      Other Cancer No family hx of      Anxiety Disorder No family hx of      MENTAL ILLNESS No family hx of      Substance Abuse No family hx of      Anesthesia Reaction No family hx of      Asthma No family hx of      OSTEOPOROSIS No family hx of      Obesity No family hx of      Unknown/Adopted No family hx of              Reviewed and updated as needed this visit by clinical staffTobacco  Allergies  Meds  Med Hx  Surg Hx  Fam Hx  Soc Hx      Reviewed and updated as needed this visit by Provider         ROS:  Constitutional, HEENT, cardiovascular, pulmonary, gi and gu systems are negative, except as otherwise noted.      OBJECTIVE:   /86 (Cuff Size: Adult  "Regular)  Pulse 74  Temp 98.4  F (36.9  C) (Tympanic)  Wt 154 lb 8 oz (70.1 kg)  LMP 06/01/2012  SpO2 97%  BMI 28.26 kg/m2  Body mass index is 28.26 kg/(m^2).  GENERAL: healthy, alert and no distress  MS: no gross musculoskeletal defects noted, no edema  NEURO: Normal strength and tone, sensory exam grossly normal and abnormal mental status - interrupts conversation, socially uninhibited, argumentative \"no I don't Brenton, No I don't Brenton\" over and over.  Repeats questions that I just answered multiple times, interrupts to tell me about her dog.    ASSESSMENT/PLAN:       Ирина was seen today for uti.    Diagnoses and all orders for this visit:    Dysuria  -     UA reflex to Microscopic and Culture    Tubular adenoma of colon    Frontotemporal dementia  -     CARE COORDINATION REFERRAL    Recurrent major depressive disorder, in partial remission (H)  -     CARE COORDINATION REFERRAL    Difficulty with activities of daily living  -     CARE COORDINATION REFERRAL    Other orders  -     Cancel: HOME CARE NURSING REFERRAL      --Care Coordination referral -it may be time to consider nursing home or memory care.  Care coordinator will call  to discuss - thank you!  --POA left at  today.  Brenton is medical decision maker.  --Patient does not have a Urinary Tract Infection today; discussed.    --Medications will be cheaper at [employer] or [employer] $12 program- patient will look into switching.  --Follow up q 3-6 months, sooner if needed.      Mag Christianson MD  [facility]      "

## 2018-05-15 NOTE — LETTER
[facility] CARE COORDINATION    May 22, 2018    Ирина Rahman  [address]  [place] [address]      Dear Ирина,    I am a clinic care coordinator who works with Mag Christianson MD. I have been trying to reach you recently to introduce Clinic Care Coordination and to see if there was anything I could assist you with.  I wanted to introduce myself and provide you with my contact information so that you can call me with questions or concerns about your health care. Below is a description of clinic care coordination and how I can further assist you.     The clinic care coordinator is a registered nurse and/or  who understand the health care system. The goal of clinic care coordination is to help you manage your health and improve access to the [place] system in the most efficient manner. The registered nurse can assist you in meeting your health care goals by providing education, coordinating services, and strengthening the communication among your providers. The  can assist you with financial, behavioral, psychosocial, chemical dependency, counseling, and/or psychiatric resources.    Please feel free to contact me at [phone], with any questions or concerns.     Sincerely,     Kellee Dickinson

## 2018-05-15 NOTE — PROGRESS NOTES
Normal Urinalysis  was reviewed with patient in office; see my office visit note for details.   Mag Christianson MD

## 2018-05-15 NOTE — MR AVS SNAPSHOT
After Visit Summary   5/15/2018    Ирина Rahman    MRN: [id number]           Patient Information     Date Of Birth          1960        Visit Information        Provider Department      5/15/2018 11:20 AM Mag Christianson MD [facility]        Today's Diagnoses     Dysuria    -  1    Tubular adenoma of colon        Frontotemporal dementia        Recurrent major depressive disorder, in partial remission (H)        Difficulty with activities of daily living           Follow-ups after your visit        Additional Services     CARE COORDINATION REFERRAL       Services are provided by a Care Coordinator for people with complex needs such as: medical, social, or financial troubles.  The Care Coordinator works with the patient and their Primary Care Provider to determine health goals, obtain resources, achieve outcomes, and develop care plans that help coordinate the patient's care.     Reason for Referral: Advanced Care Planning    Additional pertinent details:  Frontotemporal lobe dementia - plan for nursing home/memory care    Clinical Staff have discussed the Care Coordination Referral with the patient and/or caregiver: yes                  Who to contact     If you have questions or need follow up information about today's clinic visit or your schedule please contact [facility] directly at [phone].  Normal or non-critical lab and imaging results will be communicated to you by MyChart, letter or phone within 4 business days after the clinic has received the results. If you do not hear from us within 7 days, please contact the clinic through MyChart or phone. If you have a critical or abnormal lab result, we will notify you by phone as soon as possible.  Submit refill requests through [employer] or call your pharmacy and they will forward the refill request to us. Please allow 3 business days for your refill to be completed.  "         Additional Information About Your Visit        MyChart Information     Sticky lets you send messages to your doctor, view your test results, renew your prescriptions, schedule appointments and more. To sign up, go to [email or web address] . Click on \"Log in\" on the left side of the screen, which will take you to the Welcome page. Then click on \"Sign up Now\" on the right side of the page.     You will be asked to enter the access code listed below, as well as some personal information. Please follow the directions to create your username and password.     Your access code is: [email or web address]  Expires: 2018 11:48 AM     Your access code will  in 90 days. If you need help or a new code, please call your [place] clinic or [phone].        Care EveryWhere ID     This is your Care EveryWhere ID. This could be used by other organizations to access your [place] medical records  [id number]        Your Vitals Were     Pulse Temperature Last Period Pulse Oximetry BMI (Body Mass Index)       74 98.4  F (36.9  C) (Tympanic) 2012 97% 28.26 kg/m2        Blood Pressure from Last 3 Encounters:   05/15/18 136/86   01/10/18 109/73   17 116/62    Weight from Last 3 Encounters:   05/15/18 154 lb 8 oz (70.1 kg)   01/10/18 142 lb (64.4 kg)   17 138 lb 8 oz (62.8 kg)              We Performed the Following     CARE COORDINATION REFERRAL     UA reflex to Microscopic and Culture          Today's Medication Changes          These changes are accurate as of 5/15/18 11:48 AM.  If you have any questions, ask your nurse or doctor.               Stop taking these medicines if you haven't already. Please contact your care team if you have questions.     losartan-hydrochlorothiazide 100-25 MG per tablet   Commonly known as:  HYZAAR   Stopped by:  Mag Christianson MD                    Primary Care Provider Office Phone # Fax #    Mag Christianson [phone] [address] " [place] [address]        Equal Access to Services     FRANCES DE LA ROSA : Hadii aad ku hadoscareri Delfinacarlos manuel, waaxda luqadaha, qaybta kaalmamelvin vikr, laura valenzuela. So [facility] [phone].    ATENCIÓN: Si habla español, tiene a lamb disposición servicios gratuitos de asistencia lingüística. Cristalame al [phone].    We comply with applicable federal civil rights laws and [place] laws. We do not discriminate on the basis of race, color, national origin, age, disability, sex, sexual orientation, or gender identity.            Thank you!     Thank you for choosing [facility]  for your care. Our goal is always to provide you with excellent care. Hearing back from our patients is one way we can continue to improve our services. Please take a few minutes to complete the written survey that you may receive in the mail after your visit with us. Thank you!             Your Updated Medication List - Protect others around you: Learn how to safely use, store and throw away your medicines at www.disposemymeds.org.          This list is accurate as of 5/15/18 11:48 AM.  Always use your most recent med list.                   Brand Name Dispense Instructions for use Diagnosis    amLODIPine 5 MG tablet    NORVASC    90 tablet    Take 1 tablet (5 mg) by mouth daily    Benign essential hypertension       FLUoxetine 20 MG capsule    PROzac    90 capsule    Take 1 capsule (20 mg) by mouth daily    Recurrent major depressive disorder, in partial remission (H)       UNKNOWN TO PATIENT

## 2018-05-15 NOTE — LETTER
[address]  [address]  [facility] [address]  Phone: [phone]  Fax: [phone]    05/15/18    Ирина Rahman  [address] [place] [address]      To whom it may concern:     Ирина Rahman is a patient under my care.  She has major depression and frontal lobe dementia and her dog Walker serves as an emotional support animal for her.  Please take this into consideration as needed.      Sincerely,      Mag Christianson MD

## 2018-05-16 NOTE — PROGRESS NOTES
Clinic Care Coordination Contact  [facility]/Voicemail    Referral Source: PCP  Clinical Data: Care Coordinator Outreach  Outreach attempted x 2. Please see contact log for detailed outreach attempt dates. Unable to leave a voicemail with contact information.   Plan: Care Coordinator will mail out care coordination introduction letter with care coordinator contact information and explanation of care coordination services. Care Coordinator will do no further outreaches at this time.    YAMILA Roberts  Clinic Care Coordinator  [phone]  [email or web address]

## 2018-05-17 ENCOUNTER — TELEPHONE (OUTPATIENT)
Dept: FAMILY MEDICINE | Facility: CLINIC | Age: 58
End: 2018-05-17

## 2018-05-17 NOTE — TELEPHONE ENCOUNTER
Date of receipt at location: 5/17/18  [facility] or [facility]? [facility]  Type of form: healthcare directive  Checked over by facilitator? Yes  All pages present? Yes  Any obvious gaps in documentation? No  Date sent to HIMS: 5/17/18  Who brought the form in ? Patient

## 2018-05-30 ENCOUNTER — TELEPHONE (OUTPATIENT)
Dept: FAMILY MEDICINE | Facility: CLINIC | Age: 58
End: 2018-05-30

## 2018-05-30 DIAGNOSIS — R45.1 AGITATION: Primary | ICD-10-CM

## 2018-05-30 DIAGNOSIS — F43.29 ADJUSTMENT DISORDER WITH OTHER SYMPTOM: ICD-10-CM

## 2018-05-30 DIAGNOSIS — F33.41 RECURRENT MAJOR DEPRESSIVE DISORDER, IN PARTIAL REMISSION (H): ICD-10-CM

## 2018-05-30 RX ORDER — FLUOXETINE 40 MG/1
40 CAPSULE ORAL DAILY
Qty: 90 CAPSULE | Refills: 1 | Status: SHIPPED | OUTPATIENT
Start: 2018-05-30 | End: 2018-08-24

## 2018-05-30 RX ORDER — ALPRAZOLAM 0.5 MG
0.5 TABLET ORAL
Qty: 30 TABLET | Refills: 0 | Status: SHIPPED | OUTPATIENT
Start: 2018-05-30 | End: 2018-08-24

## 2018-05-30 NOTE — TELEPHONE ENCOUNTER
Patient with frontotemporal dementia progressive and worsening agitation.  No ETOH.  Sometimes agitation, hard to calm her down.  Will inc prozac to 50 mg daily.  WIll try xanax prn for extreme agitation.  Discussed risks/benefits/side effects.  Discussed with patient's , Brenton, all questions answered, in agreement with this plan.  NO MIXING with alcohol or other drugs (denies use).  Discussed may affect balance, watch carefully, may make sleepy.  Close follow up - Brenton () will bring her in next month to check in.  Dr. Mag Christianson MD/[facility]

## 2018-07-06 ENCOUNTER — TELEPHONE (OUTPATIENT)
Dept: FAMILY MEDICINE | Facility: CLINIC | Age: 58
End: 2018-07-06

## 2018-07-10 ENCOUNTER — PATIENT OUTREACH (OUTPATIENT)
Dept: CARE COORDINATION | Facility: CLINIC | Age: 58
End: 2018-07-10

## 2018-07-10 NOTE — PROGRESS NOTES
Clinic Care Coordination Contact  [facility]/Voicemail    Referral Source: Self-patient/Caregiver: Pt's spouse calling asking for assistance with personal/hygiene care for the patient.  Clinical Data: Care Coordinator Outreach  Outreach attempted x 1.  Left message on voicemail with call back information and requested return call.  Plan: Care Coordinator will try to reach patient again in 1-2 business days.    YAMILA Roberts  Clinic Care Coordinator  [phone]  [email or web address]

## 2018-07-12 ASSESSMENT — ACTIVITIES OF DAILY LIVING (ADL)
DEPENDENT_IADLS:: CLEANING;COOKING;LAUNDRY;SHOPPING;MEAL PREPARATION;MEDICATION MANAGEMENT;MONEY MANAGEMENT;TRANSPORTATION

## 2018-07-12 NOTE — PROGRESS NOTES
Clinic Care Coordination Contact  Clinic Care Coordination Contact  OUTREACH    Referral Information:  Referral Source: Self-patient/Caregiver    Primary Diagnosis: Cognitive Impairment    Chief Complaint   Patient presents with     Clinic Care Coordination - Follow-up     Caregiver Support Resources      Universal Utilization: No major concerns  Utilization    Last refreshed: 7/10/2018 11:50 AM:  No Show Count (past year) 0       Last refreshed: 7/10/2018 11:50 AM:  ED visits 0       Last refreshed: 7/10/2018 11:50 AM:  Hospital admissions 0          Current as of: 7/10/2018 11:50 AM         Clinical Concerns:  Current Medical Concerns:  Pt's spouse contacted the clinic to report that pt has demonstrated a sudden change in overall functioning and an increase in dementia related behavioral concerns. For example, pt reportedly went to the bathroom on the sidewalk yesterday outside of their home. Pt is currently enrolled in Medical Assistance and will meet the needs requirements for services this will need to be facilitated through the pt's [facility] . Outreach to pt's spouse who reports that he did contact the CM and is waiting for a response. ADINA CC requested the  Current Behavioral Concerns: Increase in dementia related behaviors    Health Maintenance Reviewed: Due/Overdue   Clinical Pathway: None    Medication Management:  Pt's spouse administers      Functional Status:  Dependent ADLs:: Ambulation-no assistive device  Dependent IADLs:: Cleaning, Cooking, Laundry, Shopping, Meal Preparation, Medication Management, Money Management, Transportation  Mobility Status: Independent    Living Situation:  Current living arrangement:: I live in a private home with spouse     Psychosocial:  Christian or spiritual beliefs that impact treatment:: No  Mental health DX:: No  Informal Support system:: Spouse  Financial/Insurance: [place]- pt is not currently on any waiver services, but does have a  with  the [facility]. Pt's spouse will send ADINA MEZA the contact information to collaborate with the CM regarding the pt's needs.     Resources and Interventions:  Current Resources: Community Resources: None  Supplies used at home:: None  Equipment Currently Used at Home: none  Advanced Care Plans/Directives on file:: No  Advanced Care Plan/Directive Status: Other (comment field) (will need to complete a POLST due to cognitive impairments)  Referrals Placed: [facility]     Goals:   Goals        General    1. Psychosocial (pt-stated)     Notes - Note created  7/12/2018  8:39 AM by Kellee Dickinson LSW    Goal Statement: I would like to add additional in-home supports by 10/2018.  Measure of Success: pt's spouse will arrange for at least two additional services to support him as a caregiver  Supportive Steps to Achieve: Contacted CM to request services  Barriers: delay in service start date   Strengths: enrolled in MA  Date to Achieve By: 10/2018            Patient/Caregiver understanding: Pt reports understanding and denies any additional questions or concerns at this times. ADINA CC engaged in AIDET communication during encounter.    Outreach Frequency: monthly      Plan: ADINA MEZA will contact the [facility] CM once contact information is provided.    YAMILA Roberts  Clinic Care Coordinator  [phone]  [email or web address]

## 2018-07-16 NOTE — PROGRESS NOTES
Clinic Care Coordination Contact  [facility]/Voicemail    Referral Source: Self-patient/Caregiver  Clinical Data: Care Coordinator Outreach  Outreach attempted x 1.  Left message on voicemail with call back information and requested return call with  information.  Plan:  Care Coordinator will await return call from YAMILA Guy  Clinic Care Coordinator  [phone]  [email or web address]

## 2018-08-20 ENCOUNTER — TELEPHONE (OUTPATIENT)
Dept: FAMILY MEDICINE | Facility: CLINIC | Age: 58
End: 2018-08-20

## 2018-08-20 NOTE — TELEPHONE ENCOUNTER
Reason for Call:  Other call back    Detailed comments: Margarette Harrison, Ирина's sister, also Margarette says she is the Health Care Power of , requests a call back from Dr. Christianson/nurse. She needs completion of the physicians report in order to have Ирина placed into memory care this week. Margarette emailed the report directly to Dr. Christianson. Please return a call to Margarette.    Phone Number Patient can be reached at: Margarette [phone]    Best Time: any    Can we leave a detailed message on this number? YES    Call taken on 8/20/2018 at 2:30 PM by Lauren Cabrera

## 2018-08-20 NOTE — TELEPHONE ENCOUNTER
Pt's sister Margarette, reports she emailed a physicians reports 08/07/18 and today that needs to be completed this week in order for patient to be admitted to a memory until. Writer is unable to verify PCP received emailed. Margarette expects a call tomorrow with status of report. Routing message to provider for review.

## 2018-08-21 ENCOUNTER — PATIENT OUTREACH (OUTPATIENT)
Dept: CARE COORDINATION | Facility: CLINIC | Age: 58
End: 2018-08-21

## 2018-08-21 ENCOUNTER — TELEPHONE (OUTPATIENT)
Dept: FAMILY MEDICINE | Facility: CLINIC | Age: 58
End: 2018-08-21

## 2018-08-21 ENCOUNTER — MEDICAL CORRESPONDENCE (OUTPATIENT)
Dept: HEALTH INFORMATION MANAGEMENT | Facility: CLINIC | Age: 58
End: 2018-08-21

## 2018-08-21 NOTE — TELEPHONE ENCOUNTER
Our goal is to have forms completed within 72 hours, however some forms may require a visit or additional information.    What clinic location was the form placed at [facility] or [place].?     Who is the form from? senior living  Where did the form come from? Faxed to clinic   The form was placed in the inbox of Mag Christianson MD      Please fax to [phone]    Additional comments:     Call take on 8/21/2018 at 1:17 PM by Pennie Yanez

## 2018-08-21 NOTE — TELEPHONE ENCOUNTER
Spoke with Margarette; let her know I did not receive anything from email.  Asked her to fax report and I'll do ASAP today.  Pennie/Team, can you watch for this and put on my desk when comes in?  Thank you,  Dr. Mag Christianson MD/[facility]

## 2018-08-21 NOTE — PROGRESS NOTES
Clinic Care Coordination Contact  Clinic Care Coordination Contact  OUTREACH    Primary Diagnosis: Neurological Disorders/Psychosocial    Chief Complaint   Patient presents with     Clinic Care Coordination - Follow-up     LTC Placement      Psychosocial:  Due to pt's continued decline in overall functioning and increase in behavioral concerns related to dementia pt plans to transition to LTC facility [facility] in [place], [place]. Physician document completed and sent in order to help facilitate transition. Pt's family denies any additional needs at this time.     Patient/Caregiver understanding: Pt reports understanding and denies any additional questions or concerns at this times. ADINA CC engaged in AIDET communication during encounter.    Plan: No further outreaches will be made at this time unless a new referral is made or a change in the pt's status occurs. Patient was provided with this writer's contact information and encouraged to call with any questions or concerns.    YAMILA Roberts  Clinic Care Coordinator  [phone]  [email or web address]

## 2018-08-24 ENCOUNTER — OFFICE VISIT (OUTPATIENT)
Dept: FAMILY MEDICINE | Facility: CLINIC | Age: 58
End: 2018-08-24
Payer: MEDICAID

## 2018-08-24 VITALS
SYSTOLIC BLOOD PRESSURE: 132 MMHG | OXYGEN SATURATION: 96 % | DIASTOLIC BLOOD PRESSURE: 84 MMHG | WEIGHT: 159 LBS | BODY MASS INDEX: 29.08 KG/M2 | TEMPERATURE: 97.7 F | HEART RATE: 74 BPM

## 2018-08-24 DIAGNOSIS — F02.818 OTHER FRONTOTEMPORAL DEMENTIA WITH BEHAVIORAL DISTURBANCE: ICD-10-CM

## 2018-08-24 DIAGNOSIS — F33.41 RECURRENT MAJOR DEPRESSIVE DISORDER, IN PARTIAL REMISSION (H): ICD-10-CM

## 2018-08-24 DIAGNOSIS — Z00.00 ROUTINE GENERAL MEDICAL EXAMINATION AT A HEALTH CARE FACILITY: Primary | ICD-10-CM

## 2018-08-24 DIAGNOSIS — G31.09 OTHER FRONTOTEMPORAL DEMENTIA WITH BEHAVIORAL DISTURBANCE: ICD-10-CM

## 2018-08-24 PROCEDURE — 99396 PREV VISIT EST AGE 40-64: CPT | Performed by: FAMILY MEDICINE

## 2018-08-24 PROCEDURE — 99213 OFFICE O/P EST LOW 20 MIN: CPT | Mod: 25 | Performed by: FAMILY MEDICINE

## 2018-08-24 NOTE — MR AVS SNAPSHOT
After Visit Summary   8/24/2018    Ирина Rahman    MRN: [id number]           Patient Information     Date Of Birth          1960        Visit Information        Provider Department      8/24/2018 9:00 AM Mag Christianson MD [facility]        Today's Diagnoses     Routine general medical examination at a health care facility    -  1    Other frontotemporal dementia with behavioral disturbance        Recurrent major depressive disorder, in partial remission (H)          Care Instructions      Preventive Health Recommendations  Female Ages 50 - 64    Yearly exam: See your health care provider every year in order to  o Review health changes.   o Discuss preventive care.    o Review your medicines if your doctor has prescribed any.      Get a Pap test every three years (unless you have an abnormal result and your provider advises testing more often).    If you get Pap tests with HPV test, you only need to test every 5 years, unless you have an abnormal result.     You do not need a Pap test if your uterus was removed (hysterectomy) and you have not had cancer.    You should be tested each year for STDs (sexually transmitted diseases) if you're at risk.     Have a mammogram every 1 to 2 years.    Have a colonoscopy at age 50, or have a yearly FIT test (stool test). These exams screen for colon cancer.      Have a cholesterol test every 5 years, or more often if advised.    Have a diabetes test (fasting glucose) every three years. If you are at risk for diabetes, you should have this test more often.     If you are at risk for osteoporosis (brittle bone disease), think about having a bone density scan (DEXA).    Shots: Get a flu shot each year. Get a tetanus shot every 10 years.    Nutrition:     Eat at least 5 servings of fruits and vegetables each day.    Eat whole-grain bread, whole-wheat pasta and brown rice instead of white grains and rice.    Get adequate  Calcium and Vitamin D.     Lifestyle    Exercise at least 150 minutes a week (30 minutes a day, 5 days a week). This will help you control your weight and prevent disease.    Limit alcohol to one drink per day.    No smoking.     Wear sunscreen to prevent skin cancer.     See your dentist every six months for an exam and cleaning.    See your eye doctor every 1 to 2 years.            Follow-ups after your visit        Who to contact     If you have questions or need follow up information about today's clinic visit or your schedule please contact [facility] directly at [phone].  Normal or non-critical lab and imaging results will be communicated to you by MyChart, letter or phone within 4 business days after the clinic has received the results. If you do not hear from us within 7 days, please contact the clinic through MyChart or phone. If you have a critical or abnormal lab result, we will notify you by phone as soon as possible.  Submit refill requests through [employer] or call your pharmacy and they will forward the refill request to us. Please allow 3 business days for your refill to be completed.          Additional Information About Your Visit        Care EveryWhere ID     This is your Care EveryWhere ID. This could be used by other organizations to access your [place] medical records  [id number]        Your Vitals Were     Pulse Temperature Last Period Pulse Oximetry BMI (Body Mass Index)       74 97.7  F (36.5  C) (Tympanic) 06/01/2012 96% 29.08 kg/m2        Blood Pressure from Last 3 Encounters:   08/24/18 132/84   05/15/18 136/86   01/10/18 109/73    Weight from Last 3 Encounters:   08/24/18 159 lb (72.1 kg)   05/15/18 154 lb 8 oz (70.1 kg)   01/10/18 142 lb (64.4 kg)              We Performed the Following     OFFICE/OUTPT VISIT,CHARI OLIVO III          Today's Medication Changes          These changes are accurate as of 8/24/18 12:42 PM.  If you have any questions,  ask your nurse or doctor.               These medicines have changed or have updated prescriptions.        Dose/Directions    FLUoxetine 20 MG capsule   Commonly known as:  PROzac   This may have changed:    - medication strength  - how much to take   Used for:  Recurrent major depressive disorder, in partial remission (H)   Changed by:  Mag Christianson MD        Dose:  20 mg   Take 1 capsule (20 mg) by mouth daily   Quantity:  270 capsule   Refills:  1         Stop taking these medicines if you haven't already. Please contact your care team if you have questions.     ALPRAZolam 0.5 MG tablet   Commonly known as:  XANAX   Stopped by:  Mag Christianson MD           amLODIPine 5 MG tablet   Commonly known as:  NORVASC   Stopped by:  Mag Christianson MD           UNKNOWN TO PATIENT   Stopped by:  Mag Christianson MD                Where to get your medicines      These medications were sent to [facility] [address] [place] [address] & [address] [address]     Phone:  [phone]     FLUoxetine 20 MG capsule                Primary Care Provider Office Phone # Fax #    Mga Christianson [phone]       [address] [place] [address]        Equal Access to Services     FRANCES DE LA ROSA AH: Hadii alban bradyo Socarlos manuel, waaxda luqadaha, qaybta kaalmada adeegyada, laura valenzuela. So [facility] [phone].    ATENCIÓN: Si habla español, tiene a lamb disposición servicios gratuitos de asistencia lingüística. Nathaly al [phone].    We comply with applicable federal civil rights laws and [place] laws. We do not discriminate on the basis of race, color, national origin, age, disability, sex, sexual orientation, or gender identity.            Thank you!     Thank you for choosing [facility]  for your care. Our goal is always to provide you with excellent care.  Hearing back from our patients is one way we can continue to improve our services. Please take a few minutes to complete the written survey that you may receive in the mail after your visit with us. Thank you!             Your Updated Medication List - Protect others around you: Learn how to safely use, store and throw away your medicines at www.disposemymeds.org.          This list is accurate as of 8/24/18 12:42 PM.  Always use your most recent med list.                   Brand Name Dispense Instructions for use Diagnosis    FLUoxetine 20 MG capsule    PROzac    270 capsule    Take 1 capsule (20 mg) by mouth daily    Recurrent major depressive disorder, in partial remission (H)

## 2018-08-24 NOTE — PROGRESS NOTES
"     SUBJECTIVE:   CC: Ирина Rahman is an 58 year old woman who presents for preventive health visit.     Healthy Habits:    Do you get at least three servings of calcium containing foods daily (dairy, green leafy vegetables, etc.)? yes    Amount of exercise or daily activities, outside of work: walk dog daily    Problems taking medications regularly No    Medication side effects: No    Have you had an eye exam in the past two years? no    Do you see a dentist twice per year? once    Do you have sleep apnea, excessive snoring or daytime drowsiness?no      PROBLEMS TO ADD ON...  58 year old with progressive frontotemporal dementia, here with sister and  for physical and also for discussion re: placement in home setting.  It's been increasingly difficult for Brenton and family to care for Ирина, she requires diaper changes every 2 hours, has erratic behavior, believes she can still drive, can't dress or care for self, and needs constant supervision.  They are wondering if it's the right thing to have her move to nursing home?  She has placement but Brenton is worried that it's \"too soon\".  Plan:  This is a personal decision, but based on my exam with Ирина today I don't think it's too soon to move her.  She is requiring 24 hour care as well as diapering, bathing, all personal hygiene and that is a lot for one person to do in addition to full time job.  Offered Brenton and Ирина support.        Today's PHQ-2 Score:   PHQ-2 ( 1999 Pfizer) 1/10/2018 4/22/2017   Q1: Little interest or pleasure in doing things 0 0   Q2: Feeling down, depressed or hopeless 0 0   PHQ-2 Score 0 0     Abuse: Current or Past(Physical, Sexual or Emotional)- No  Do you feel safe in your environment - Yes    Social History   Substance Use Topics     Smoking status: Never Smoker     Smokeless tobacco: Never Used     Alcohol use 0.0 oz/week     0 Standard drinks or equivalent per week      Comment: wine occ     If you drink alcohol do you " "typically have >3 drinks per day or >7 drinks per week? No                     Reviewed orders with patient.  Reviewed health maintenance and updated orders accordingly - Yes    Mammogram not appropriate for this patient based on illness.    Pertinent mammograms are reviewed under the imaging tab.  History of abnormal Pap smear: Pap not appropriate based on illness.  PAP / HPV Latest Ref Rng & Units 6/16/2017 4/3/2014 2/17/2011   PAP - NIL NIL NIL   HPV 16 DNA NEG Negative - -   HPV 18 DNA NEG Negative - -   OTHER HR HPV NEG Negative - -     Reviewed and updated as needed this visit by clinical staff  Tobacco  Allergies  Meds  Med Hx  Surg Hx  Fam Hx  Soc Hx        Reviewed and updated as needed this visit by Provider         ROS:  Patient unable to do; attempted.  Diffusely \"no, I'm fine and I feel fine\" answer.    OBJECTIVE:   /84 (Cuff Size: Adult Regular)  Pulse 74  Temp 97.7  F (36.5  C) (Tympanic)  Wt 159 lb (72.1 kg)  LMP 06/01/2012  SpO2 96%  BMI 29.08 kg/m2  EXAM:  NECK: no adenopathy, no asymmetry, masses, or scars and thyroid normal to palpation  RESP: lungs clear to auscultation - no rales, rhonchi or wheezes  CV: regular rate and rhythm, normal S1 S2, no S3 or S4, no murmur, click or rub, no peripheral edema and peripheral pulses strong  ABDOMEN: soft, nontender, no hepatosplenomegaly, no masses and bowel sounds normal  MS: no gross musculoskeletal defects noted, no edema  PSYCH: affect flat.  Walks out of room repeatedly, looking for .  States \"did you know I have a little dog?\" when I first walk into room.  Interrupts, states \"I can drive\".      ASSESSMENT/PLAN:       ICD-10-CM    1. Routine general medical examination at a health care facility Z00.00    2. Other frontotemporal dementia with behavioral disturbance G31.09 OFFICE/OUTPT VISIT,EST,LEVL III    F02.81    3. Recurrent major depressive disorder, in partial remission (H) F33.41 FLUoxetine (PROZAC) 20 MG capsule " "      58 year old with progressive frontotemporal dementia, here with sister and  for physical and also for discussion re: placement in home setting.  It's been increasingly difficult for Brenton and family to care for Ирина, she requires diaper changes every 2 hours, has erratic behavior, believes she can still drive, can't dress or care for self, and needs constant supervision.  They are wondering if it's the right thing to have her move to nursing home?  She has placement but Brenton is worried that it's \"too soon\".  Plan:  This is a personal decision, but based on my exam with Ирина today I don't think it's too soon to move her.  She is requiring 24 hour care as well as diapering, bathing, all personal hygiene and that is a lot for one person to do in addition to full time job.  Offered Brenton and Ирина support.      No further preventative medications or screening at this time due to serious progressive dementia and illness.  Stopped blood pressure medications. Continue prozac as it seems to really help symptoms and behavior according to  Brenton and sister who is here today.  They had increased to 60 mg and noted improvement, will continue this dose.     COUNSELING:     BP Readings from Last 1 Encounters:   08/24/18 132/84     Estimated body mass index is 29.08 kg/(m^2) as calculated from the following:    Height as of 1/10/18: 5' 2\" (1.575 m).    Weight as of this encounter: 159 lb (72.1 kg).     reports that she has never smoked. She has never used smokeless tobacco.      Counseling Resources:  ATP IV Guidelines  Pooled Cohorts Equation Calculator  Breast Cancer Risk Calculator  FRAX Risk Assessment  ICSI Preventive Guidelines  Dietary Guidelines for Americans, 2010  USDA's MyPlate  ASA Prophylaxis  Lung CA Screening    Mag Christianson MD  [facility]  "

## 2018-10-31 ENCOUNTER — RECORDS - HEALTHEAST (OUTPATIENT)
Dept: LAB | Facility: CLINIC | Age: 58
End: 2018-10-31

## 2018-10-31 LAB
ANION GAP SERPL CALCULATED.3IONS-SCNC: 9 MMOL/L (ref 5–18)
BUN SERPL-MCNC: 14 MG/DL (ref 8–22)
CALCIUM SERPL-MCNC: 9.2 MG/DL (ref 8.5–10.5)
CHLORIDE BLD-SCNC: 103 MMOL/L (ref 98–107)
CO2 SERPL-SCNC: 29 MMOL/L (ref 22–31)
CREAT SERPL-MCNC: 0.74 MG/DL (ref 0.6–1.1)
ERYTHROCYTE [DISTWIDTH] IN BLOOD BY AUTOMATED COUNT: 12.7 % (ref 11–14.5)
GFR SERPL CREATININE-BSD FRML MDRD: >60 ML/MIN/1.73M2
GLUCOSE BLD-MCNC: 138 MG/DL (ref 70–125)
HCT VFR BLD AUTO: 42.7 % (ref 35–47)
HGB BLD-MCNC: 13.8 G/DL (ref 12–16)
MCH RBC QN AUTO: 29.6 PG (ref 27–34)
MCHC RBC AUTO-ENTMCNC: 32.3 G/DL (ref 32–36)
MCV RBC AUTO: 91 FL (ref 80–100)
PLATELET # BLD AUTO: 179 THOU/UL (ref 140–440)
PMV BLD AUTO: 11.4 FL (ref 8.5–12.5)
POTASSIUM BLD-SCNC: 3.8 MMOL/L (ref 3.5–5)
RBC # BLD AUTO: 4.67 MILL/UL (ref 3.8–5.4)
SODIUM SERPL-SCNC: 141 MMOL/L (ref 136–145)
WBC: 5.2 THOU/UL (ref 4–11)

## 2018-11-01 LAB — 25(OH)D3 SERPL-MCNC: 16 NG/ML (ref 30–80)

## 2018-12-13 ENCOUNTER — TELEPHONE (OUTPATIENT)
Dept: FAMILY MEDICINE | Facility: CLINIC | Age: 58
End: 2018-12-13

## 2018-12-13 NOTE — TELEPHONE ENCOUNTER
SHAE WestbrookI , calling with update.   [facility].      Beatrice Hernandez can be reached at: [phone], ok to leave detailed message    Routing to provider as FYI, no action required.    BURKE RocheN, RN  [employer] [facility]

## 2018-12-14 ENCOUNTER — TELEPHONE (OUTPATIENT)
Dept: FAMILY MEDICINE | Facility: CLINIC | Age: 58
End: 2018-12-14

## 2018-12-14 NOTE — TELEPHONE ENCOUNTER
Noted, thanks.  Will watch for forms.  Dr. Mag Christianson MD/[facility]

## 2018-12-14 NOTE — TELEPHONE ENCOUNTER
Our goal is to have forms completed within 72 hours, however some forms may require a visit or additional information.    What clinic location was the form placed at [facility] or [place].?     Who is the form from?   Where did the form come from? Faxed to clinic   The form was placed in the inbox of Mag Christianson MD      Please fax to [phone]  Phone number: [phone]    Additional comments: [facility] - pull up medium/bath bench    Call take on 12/14/2018 at 11:21 AM by Ramiro Bach

## 2018-12-18 ENCOUNTER — MEDICAL CORRESPONDENCE (OUTPATIENT)
Dept: HEALTH INFORMATION MANAGEMENT | Facility: CLINIC | Age: 58
End: 2018-12-18

## 2019-04-24 ENCOUNTER — RECORDS - HEALTHEAST (OUTPATIENT)
Dept: LAB | Facility: CLINIC | Age: 59
End: 2019-04-24

## 2019-04-24 LAB
ALBUMIN SERPL-MCNC: 3.5 G/DL (ref 3.5–5)
ALP SERPL-CCNC: 71 U/L (ref 45–120)
ALT SERPL W P-5'-P-CCNC: 12 U/L (ref 0–45)
ANION GAP SERPL CALCULATED.3IONS-SCNC: 10 MMOL/L (ref 5–18)
AST SERPL W P-5'-P-CCNC: 14 U/L (ref 0–40)
BILIRUB SERPL-MCNC: 0.3 MG/DL (ref 0–1)
BUN SERPL-MCNC: 14 MG/DL (ref 8–22)
CALCIUM SERPL-MCNC: 9.3 MG/DL (ref 8.5–10.5)
CHLORIDE BLD-SCNC: 101 MMOL/L (ref 98–107)
CO2 SERPL-SCNC: 30 MMOL/L (ref 22–31)
CREAT SERPL-MCNC: 0.78 MG/DL (ref 0.6–1.1)
ERYTHROCYTE [DISTWIDTH] IN BLOOD BY AUTOMATED COUNT: 12.4 % (ref 11–14.5)
GFR SERPL CREATININE-BSD FRML MDRD: >60 ML/MIN/1.73M2
GLUCOSE BLD-MCNC: 71 MG/DL (ref 70–125)
HCT VFR BLD AUTO: 41 % (ref 35–47)
HGB BLD-MCNC: 13.1 G/DL (ref 12–16)
MCH RBC QN AUTO: 29.6 PG (ref 27–34)
MCHC RBC AUTO-ENTMCNC: 32 G/DL (ref 32–36)
MCV RBC AUTO: 93 FL (ref 80–100)
PLATELET # BLD AUTO: 212 THOU/UL (ref 140–440)
PMV BLD AUTO: 11.4 FL (ref 8.5–12.5)
POTASSIUM BLD-SCNC: 3.8 MMOL/L (ref 3.5–5)
PROT SERPL-MCNC: 6.3 G/DL (ref 6–8)
RBC # BLD AUTO: 4.42 MILL/UL (ref 3.8–5.4)
SODIUM SERPL-SCNC: 141 MMOL/L (ref 136–145)
WBC: 5.7 THOU/UL (ref 4–11)

## 2019-04-25 LAB — 25(OH)D3 SERPL-MCNC: 24.3 NG/ML (ref 30–80)

## 2019-06-20 NOTE — TELEPHONE ENCOUNTER
Called the  back, he would like advice on bathing the patient or asking what options he has for getting help with taking care of her hygiene.     The patient does qualify for home care.     Per chart review, it appears that Kellee, the  has attempted to contact patient.    The patient has extreme dementia. There is a consent to communicate for us to talk with her , that is who we should communicate with.   
Left voice message asking pt to call triage back.    
Please see Clinic Care Coordination Outreach.    Kellee Dickinson [facility]  Clinic Care Coordinator  [phone]  [email or web address]      
Reason for Call:  Other call back    Detailed comments: , Brenton, called needed advice on his wife who has dementia.     Phone Number Patient can be reached at: Brenton:  [phone]    Best Time: any    Can we leave a detailed message on this number? YES    Call taken on 7/6/2018 at 12:00 PM by Lauren Cabrera        
[id number]

## 2019-10-02 ENCOUNTER — RECORDS - HEALTHEAST (OUTPATIENT)
Dept: LAB | Facility: CLINIC | Age: 59
End: 2019-10-02

## 2019-10-02 LAB
ERYTHROCYTE [DISTWIDTH] IN BLOOD BY AUTOMATED COUNT: 12.3 % (ref 11–14.5)
HCT VFR BLD AUTO: 41.8 % (ref 35–47)
HGB BLD-MCNC: 13.3 G/DL (ref 12–16)
MCH RBC QN AUTO: 30 PG (ref 27–34)
MCHC RBC AUTO-ENTMCNC: 31.8 G/DL (ref 32–36)
MCV RBC AUTO: 94 FL (ref 80–100)
PLATELET # BLD AUTO: 160 THOU/UL (ref 140–440)
PMV BLD AUTO: 13 FL (ref 8.5–12.5)
RBC # BLD AUTO: 4.43 MILL/UL (ref 3.8–5.4)
WBC: 5.6 THOU/UL (ref 4–11)

## 2019-10-03 LAB
ALBUMIN SERPL-MCNC: NORMAL G/DL
ALP SERPL-CCNC: NORMAL U/L
ALT SERPL W P-5'-P-CCNC: NORMAL U/L
ANION GAP SERPL CALCULATED.3IONS-SCNC: NORMAL MMOL/L
AST SERPL W P-5'-P-CCNC: NORMAL U/L
BILIRUB SERPL-MCNC: NORMAL MG/DL
BUN SERPL-MCNC: NORMAL MG/DL
CALCIUM SERPL-MCNC: NORMAL MG/DL
CHLORIDE BLD-SCNC: NORMAL MMOL/L
CO2 SERPL-SCNC: NORMAL MMOL/L
CREAT SERPL-MCNC: NORMAL MG/DL
GFR SERPL CREATININE-BSD FRML MDRD: NORMAL ML/MIN/{1.73_M2}
GLUCOSE BLD-MCNC: NORMAL MG/DL
POTASSIUM BLD-SCNC: NORMAL MMOL/L
PROT SERPL-MCNC: NORMAL G/DL
SODIUM SERPL-SCNC: NORMAL MMOL/L

## 2019-10-04 ENCOUNTER — RECORDS - HEALTHEAST (OUTPATIENT)
Dept: LAB | Facility: CLINIC | Age: 59
End: 2019-10-04

## 2019-10-04 LAB
ALBUMIN SERPL-MCNC: 3.5 G/DL (ref 3.5–5)
ALP SERPL-CCNC: 97 U/L (ref 45–120)
ALT SERPL W P-5'-P-CCNC: 15 U/L (ref 0–45)
ANION GAP SERPL CALCULATED.3IONS-SCNC: 9 MMOL/L (ref 5–18)
AST SERPL W P-5'-P-CCNC: 13 U/L (ref 0–40)
BILIRUB SERPL-MCNC: 0.3 MG/DL (ref 0–1)
BUN SERPL-MCNC: 13 MG/DL (ref 8–22)
CALCIUM SERPL-MCNC: 9.3 MG/DL (ref 8.5–10.5)
CHLORIDE BLD-SCNC: 102 MMOL/L (ref 98–107)
CO2 SERPL-SCNC: 30 MMOL/L (ref 22–31)
CREAT SERPL-MCNC: 0.74 MG/DL (ref 0.6–1.1)
GFR SERPL CREATININE-BSD FRML MDRD: >60 ML/MIN/1.73M2
GLUCOSE BLD-MCNC: 133 MG/DL (ref 70–125)
POTASSIUM BLD-SCNC: 3.9 MMOL/L (ref 3.5–5)
PROT SERPL-MCNC: 7 G/DL (ref 6–8)
SODIUM SERPL-SCNC: 141 MMOL/L (ref 136–145)

## 2019-10-23 ENCOUNTER — RECORDS - HEALTHEAST (OUTPATIENT)
Dept: LAB | Facility: CLINIC | Age: 59
End: 2019-10-23

## 2019-10-24 LAB — 25(OH)D3 SERPL-MCNC: 32.9 NG/ML (ref 30–80)

## 2020-06-12 ENCOUNTER — DOCUMENTATION ONLY (OUTPATIENT)
Dept: FAMILY MEDICINE | Facility: CLINIC | Age: 60
End: 2020-06-12

## 2020-06-12 NOTE — PROGRESS NOTES
Reason for call:  Form   Our goal is to have forms completed within 72 hours, however some forms may require a visit or additional information.     Who is the form from? Insurance comp  Where did the form come from? form was mailed in  What clinic location was the form placed at?   Where was the form placed? FOLDER Box/Folder  What number is listed as a contact on the form? [phone]    Phone call message - patient request for a letter, form or note:     Date needed: as soon as possible  Please mail to Self addressed stamped envelope  Has the patient signed a consent form for release of information? YES    Additional comments:     Type of letter, form or note: medical    Phone number to reach patient:      Best Time:      Can we leave a detailed message on this number?      Travel screening:

## 2020-06-29 NOTE — PROGRESS NOTES
Form has been faxed to ([phone]) and mailed in enclosed envelope to [facility]   Copy has been placed in abstraction     Mag Jacobs CMA on 6/29/2020 at 8:19 AM

## 2020-08-11 ENCOUNTER — RECORDS - HEALTHEAST (OUTPATIENT)
Dept: LAB | Facility: CLINIC | Age: 60
End: 2020-08-11

## 2020-08-12 LAB
ALBUMIN SERPL-MCNC: 3.2 G/DL (ref 3.5–5)
ALP SERPL-CCNC: 90 U/L (ref 45–120)
ALT SERPL W P-5'-P-CCNC: 20 U/L (ref 0–45)
ANION GAP SERPL CALCULATED.3IONS-SCNC: 10 MMOL/L (ref 5–18)
AST SERPL W P-5'-P-CCNC: 17 U/L (ref 0–40)
BILIRUB SERPL-MCNC: 0.3 MG/DL (ref 0–1)
BUN SERPL-MCNC: 15 MG/DL (ref 8–22)
CALCIUM SERPL-MCNC: 8.8 MG/DL (ref 8.5–10.5)
CHLORIDE BLD-SCNC: 103 MMOL/L (ref 98–107)
CO2 SERPL-SCNC: 27 MMOL/L (ref 22–31)
CREAT SERPL-MCNC: 0.76 MG/DL (ref 0.6–1.1)
ERYTHROCYTE [DISTWIDTH] IN BLOOD BY AUTOMATED COUNT: 12.9 % (ref 11–14.5)
GFR SERPL CREATININE-BSD FRML MDRD: >60 ML/MIN/1.73M2
GLUCOSE BLD-MCNC: 104 MG/DL (ref 70–125)
HCT VFR BLD AUTO: 40.8 % (ref 35–47)
HGB BLD-MCNC: 13.1 G/DL (ref 12–16)
MCH RBC QN AUTO: 29.9 PG (ref 27–34)
MCHC RBC AUTO-ENTMCNC: 32.1 G/DL (ref 32–36)
MCV RBC AUTO: 93 FL (ref 80–100)
PLATELET # BLD AUTO: 244 THOU/UL (ref 140–440)
PMV BLD AUTO: 11.4 FL (ref 8.5–12.5)
POTASSIUM BLD-SCNC: 3.6 MMOL/L (ref 3.5–5)
PROT SERPL-MCNC: 6.6 G/DL (ref 6–8)
RBC # BLD AUTO: 4.38 MILL/UL (ref 3.8–5.4)
SODIUM SERPL-SCNC: 140 MMOL/L (ref 136–145)
WBC: 6.1 THOU/UL (ref 4–11)

## 2020-12-14 ENCOUNTER — TELEPHONE (OUTPATIENT)
Dept: FAMILY MEDICINE | Facility: CLINIC | Age: 60
End: 2020-12-14

## 2020-12-14 NOTE — TELEPHONE ENCOUNTER
Order form from [facility] incontinence supply order prescription for pull up medium 20/PK prevail put in providers wall/box up front by fax machine

## 2021-01-05 ENCOUNTER — RECORDS - HEALTHEAST (OUTPATIENT)
Dept: LAB | Facility: CLINIC | Age: 61
End: 2021-01-05

## 2021-01-06 LAB
ANION GAP SERPL CALCULATED.3IONS-SCNC: 14 MMOL/L (ref 5–18)
BUN SERPL-MCNC: 17 MG/DL (ref 8–22)
CALCIUM SERPL-MCNC: 9.4 MG/DL (ref 8.5–10.5)
CHLORIDE BLD-SCNC: 98 MMOL/L (ref 98–107)
CO2 SERPL-SCNC: 25 MMOL/L (ref 22–31)
CREAT SERPL-MCNC: 0.73 MG/DL (ref 0.6–1.1)
GFR SERPL CREATININE-BSD FRML MDRD: >60 ML/MIN/1.73M2
GLUCOSE BLD-MCNC: 138 MG/DL (ref 70–125)
POTASSIUM BLD-SCNC: 4.6 MMOL/L (ref 3.5–5)
SODIUM SERPL-SCNC: 137 MMOL/L (ref 136–145)

## 2021-01-07 ENCOUNTER — RECORDS - HEALTHEAST (OUTPATIENT)
Dept: LAB | Facility: CLINIC | Age: 61
End: 2021-01-07

## 2021-01-08 LAB
ANION GAP SERPL CALCULATED.3IONS-SCNC: 13 MMOL/L (ref 5–18)
BUN SERPL-MCNC: 23 MG/DL (ref 8–22)
CALCIUM SERPL-MCNC: 9.1 MG/DL (ref 8.5–10.5)
CHLORIDE BLD-SCNC: 104 MMOL/L (ref 98–107)
CO2 SERPL-SCNC: 25 MMOL/L (ref 22–31)
CREAT SERPL-MCNC: 0.77 MG/DL (ref 0.6–1.1)
GFR SERPL CREATININE-BSD FRML MDRD: >60 ML/MIN/1.73M2
GLUCOSE BLD-MCNC: 140 MG/DL (ref 70–125)
POTASSIUM BLD-SCNC: 3.4 MMOL/L (ref 3.5–5)
SODIUM SERPL-SCNC: 142 MMOL/L (ref 136–145)

## 2021-02-18 ENCOUNTER — TELEPHONE (OUTPATIENT)
Dept: FAMILY MEDICINE | Facility: CLINIC | Age: 61
End: 2021-02-18

## 2021-02-18 NOTE — TELEPHONE ENCOUNTER
Faxed to [facility] [facility] the Incontinence Supply order prescription and placed in provider basket.  Amira Anderson MA on 2/18/2021 at 11:30 AM

## 2021-02-18 NOTE — TELEPHONE ENCOUNTER
[facility] called back stating they still haven't received the fax. They asked if the forms could be emailed to her instead.     Email is [email or web address]

## 2021-02-22 ENCOUNTER — RECORDS - HEALTHEAST (OUTPATIENT)
Dept: LAB | Facility: CLINIC | Age: 61
End: 2021-02-22

## 2021-02-24 LAB
HBA1C MFR BLD: 5.5 %
HGB BLD-MCNC: 13.3 G/DL (ref 12–16)

## 2021-02-24 NOTE — TELEPHONE ENCOUNTER
Form has been emailed to [email or web address] with ICD code attached     Mag Jacobs CMA on 2/24/2021 at 1:31 PM

## 2021-03-16 NOTE — TELEPHONE ENCOUNTER
Faxed incontinence supply order prescription to [employer]  Date received fax: 03/15/2021    Etelvina Patel MA

## 2021-08-19 ENCOUNTER — LAB REQUISITION (OUTPATIENT)
Dept: LAB | Facility: CLINIC | Age: 61
End: 2021-08-19
Payer: MEDICARE

## 2021-08-19 DIAGNOSIS — Z03.818 ENCOUNTER FOR OBSERVATION FOR SUSPECTED EXPOSURE TO OTHER BIOLOGICAL AGENTS RULED OUT: ICD-10-CM

## 2021-08-19 PROCEDURE — U0005 INFEC AGEN DETEC AMPLI PROBE: HCPCS | Mod: ORL | Performed by: FAMILY MEDICINE

## 2021-08-20 LAB — SARS-COV-2 RNA RESP QL NAA+PROBE: NEGATIVE

## 2021-08-23 ENCOUNTER — LAB REQUISITION (OUTPATIENT)
Dept: LAB | Facility: CLINIC | Age: 61
End: 2021-08-23
Payer: MEDICARE

## 2021-08-23 DIAGNOSIS — Z03.818 ENCOUNTER FOR OBSERVATION FOR SUSPECTED EXPOSURE TO OTHER BIOLOGICAL AGENTS RULED OUT: ICD-10-CM

## 2021-08-23 PROCEDURE — U0005 INFEC AGEN DETEC AMPLI PROBE: HCPCS | Mod: ORL | Performed by: NURSE PRACTITIONER

## 2021-08-24 LAB — SARS-COV-2 RNA RESP QL NAA+PROBE: NEGATIVE

## 2021-08-25 ENCOUNTER — LAB REQUISITION (OUTPATIENT)
Dept: LAB | Facility: CLINIC | Age: 61
End: 2021-08-25
Payer: MEDICARE

## 2021-08-25 DIAGNOSIS — Z03.818 ENCOUNTER FOR OBSERVATION FOR SUSPECTED EXPOSURE TO OTHER BIOLOGICAL AGENTS RULED OUT: ICD-10-CM

## 2021-08-26 PROCEDURE — U0005 INFEC AGEN DETEC AMPLI PROBE: HCPCS | Mod: ORL | Performed by: NURSE PRACTITIONER

## 2021-08-27 ENCOUNTER — LAB REQUISITION (OUTPATIENT)
Dept: LAB | Facility: CLINIC | Age: 61
End: 2021-08-27
Payer: MEDICARE

## 2021-08-27 DIAGNOSIS — Z03.818 ENCOUNTER FOR OBSERVATION FOR SUSPECTED EXPOSURE TO OTHER BIOLOGICAL AGENTS RULED OUT: ICD-10-CM

## 2021-08-27 LAB — SARS-COV-2 RNA RESP QL NAA+PROBE: NEGATIVE

## 2021-08-30 PROCEDURE — U0003 INFECTIOUS AGENT DETECTION BY NUCLEIC ACID (DNA OR RNA); SEVERE ACUTE RESPIRATORY SYNDROME CORONAVIRUS 2 (SARS-COV-2) (CORONAVIRUS DISEASE [COVID-19]), AMPLIFIED PROBE TECHNIQUE, MAKING USE OF HIGH THROUGHPUT TECHNOLOGIES AS DESCRIBED BY CMS-2020-01-R: HCPCS | Mod: ORL | Performed by: NURSE PRACTITIONER

## 2021-08-31 LAB — SARS-COV-2 RNA RESP QL NAA+PROBE: NEGATIVE

## 2021-09-01 ENCOUNTER — LAB REQUISITION (OUTPATIENT)
Dept: LAB | Facility: CLINIC | Age: 61
End: 2021-09-01
Payer: MEDICARE

## 2021-09-01 DIAGNOSIS — Z03.818 ENCOUNTER FOR OBSERVATION FOR SUSPECTED EXPOSURE TO OTHER BIOLOGICAL AGENTS RULED OUT: ICD-10-CM

## 2021-09-02 PROCEDURE — U0003 INFECTIOUS AGENT DETECTION BY NUCLEIC ACID (DNA OR RNA); SEVERE ACUTE RESPIRATORY SYNDROME CORONAVIRUS 2 (SARS-COV-2) (CORONAVIRUS DISEASE [COVID-19]), AMPLIFIED PROBE TECHNIQUE, MAKING USE OF HIGH THROUGHPUT TECHNOLOGIES AS DESCRIBED BY CMS-2020-01-R: HCPCS | Mod: ORL | Performed by: NURSE PRACTITIONER

## 2021-09-03 LAB — SARS-COV-2 RNA RESP QL NAA+PROBE: NEGATIVE

## 2021-09-07 ENCOUNTER — LAB REQUISITION (OUTPATIENT)
Dept: LAB | Facility: CLINIC | Age: 61
End: 2021-09-07
Payer: MEDICARE

## 2021-09-07 DIAGNOSIS — U07.1 COVID-19: ICD-10-CM

## 2021-09-07 PROCEDURE — U0005 INFEC AGEN DETEC AMPLI PROBE: HCPCS | Mod: ORL | Performed by: NURSE PRACTITIONER

## 2021-09-08 ENCOUNTER — LAB REQUISITION (OUTPATIENT)
Dept: LAB | Facility: CLINIC | Age: 61
End: 2021-09-08
Payer: MEDICARE

## 2021-09-08 LAB — SARS-COV-2 RNA RESP QL NAA+PROBE: NEGATIVE

## 2021-09-10 PROCEDURE — U0003 INFECTIOUS AGENT DETECTION BY NUCLEIC ACID (DNA OR RNA); SEVERE ACUTE RESPIRATORY SYNDROME CORONAVIRUS 2 (SARS-COV-2) (CORONAVIRUS DISEASE [COVID-19]), AMPLIFIED PROBE TECHNIQUE, MAKING USE OF HIGH THROUGHPUT TECHNOLOGIES AS DESCRIBED BY CMS-2020-01-R: HCPCS | Mod: ORL | Performed by: NURSE PRACTITIONER

## 2021-09-11 LAB — SARS-COV-2 RNA RESP QL NAA+PROBE: NEGATIVE

## 2021-09-14 ENCOUNTER — LAB REQUISITION (OUTPATIENT)
Dept: LAB | Facility: CLINIC | Age: 61
End: 2021-09-14
Payer: MEDICARE

## 2021-09-14 DIAGNOSIS — U07.1 COVID-19: ICD-10-CM

## 2021-09-14 PROCEDURE — U0003 INFECTIOUS AGENT DETECTION BY NUCLEIC ACID (DNA OR RNA); SEVERE ACUTE RESPIRATORY SYNDROME CORONAVIRUS 2 (SARS-COV-2) (CORONAVIRUS DISEASE [COVID-19]), AMPLIFIED PROBE TECHNIQUE, MAKING USE OF HIGH THROUGHPUT TECHNOLOGIES AS DESCRIBED BY CMS-2020-01-R: HCPCS | Mod: ORL | Performed by: NURSE PRACTITIONER

## 2021-09-15 LAB — SARS-COV-2 RNA RESP QL NAA+PROBE: NEGATIVE

## 2022-12-18 NOTE — TELEPHONE ENCOUNTER
[facility] called back, never rec'd fax please re fax to [phone].    zee siddiqui

## 2025-04-01 NOTE — MR AVS SNAPSHOT
"              After Visit Summary   3/28/2017    Ирина Rahman    MRN: [id number]           Patient Information     Date Of Birth          1960        Visit Information        Provider Department      3/28/2017 11:30 AM Sandra Lovett AuD [facility]        Today's Diagnoses     Central auditory processing disorder (CAPD)    -  1       Follow-ups after your visit        Who to contact     If you have questions or need follow up information about today's clinic visit or your schedule please contact [facility] directly at [phone].  Normal or non-critical lab and imaging results will be communicated to you by [employer], letter or phone within 4 business days after the clinic has received the results. If you do not hear from us within 7 days, please contact the clinic through [employer] or phone. If you have a critical or abnormal lab result, we will notify you by phone as soon as possible.  Submit refill requests through [employer] or call your pharmacy and they will forward the refill request to us. Please allow 3 business days for your refill to be completed.          Additional Information About Your Visit        MyChart Information     [employer] lets you send messages to your doctor, view your test results, renew your prescriptions, schedule appointments and more. To sign up, go to [email or web address] . Click on \"Log in\" on the left side of the screen, which will take you to the Welcome page. Then click on \"Sign up Now\" on the right side of the page.     You will be asked to enter the access code listed below, as well as some personal information. Please follow the directions to create your username and password.     Your access code is: [email or web address]  Expires: 6/15/2017 11:14 AM     Your access code will  in 90 days. If you need help or a new code, please call your [facility]3] or [phone].        Care EveryWhere ID     This is your Care EveryWhere " ID. This could be used by other organizations to access your [place] medical records  [id number]        Your Vitals Were     Last Period                   06/01/2012            Blood Pressure from Last 3 Encounters:   03/17/17 160/90   11/01/16 118/86   10/26/16 (!) 158/100    Weight from Last 3 Encounters:   03/17/17 165 lb (74.8 kg)   11/01/16 171 lb (77.6 kg)   10/26/16 177 lb 3.2 oz (80.4 kg)              We Performed the Following     AUDIOGRAM/TYMPANOGRAM - INTERFACE     COMPREHENSIVE HEARING TEST     TYMPANOMETRY        Primary Care Provider Office Phone # Fax #    Mag Christianson -[phone]       [address] [address]        Thank you!     Thank you for choosing [facility]  for your care. Our goal is always to provide you with excellent care. Hearing back from our patients is one way we can continue to improve our services. Please take a few minutes to complete the written survey that you may receive in the mail after your visit with us. Thank you!             Your Updated Medication List - Protect others around you: Learn how to safely use, store and throw away your medicines at www.disposemymeds.org.          This list is accurate as of: 3/28/17  6:33 PM.  Always use your most recent med list.                   Brand Name Dispense Instructions for use    losartan-hydrochlorothiazide 100-25 MG per tablet    HYZAAR    30 tablet    Take 1 tablet by mouth daily       UNKNOWN TO PATIENT             Elevated troponin